# Patient Record
Sex: MALE | Race: WHITE | Employment: OTHER | URBAN - METROPOLITAN AREA
[De-identification: names, ages, dates, MRNs, and addresses within clinical notes are randomized per-mention and may not be internally consistent; named-entity substitution may affect disease eponyms.]

---

## 2017-02-01 ENCOUNTER — APPOINTMENT (OUTPATIENT)
Dept: LAB | Age: 72
End: 2017-02-01
Attending: INTERNAL MEDICINE
Payer: MEDICARE

## 2017-02-01 DIAGNOSIS — R80.9 PROTEINURIA: ICD-10-CM

## 2017-02-01 DIAGNOSIS — N18.3 CKD (CHRONIC KIDNEY DISEASE), STAGE 3 (MODERATE): ICD-10-CM

## 2017-02-01 LAB
BILIRUB UR QL STRIP.AUTO: NEGATIVE
BUN BLD-MCNC: 19 MG/DL (ref 8–20)
CALCIUM BLD-MCNC: 9 MG/DL (ref 8.3–10.3)
CHLORIDE: 107 MMOL/L (ref 101–111)
CLARITY UR REFRACT.AUTO: CLEAR
CO2: 25 MMOL/L (ref 22–32)
COLOR UR AUTO: YELLOW
CREAT BLD-MCNC: 1.39 MG/DL (ref 0.7–1.3)
CREAT UR-SCNC: 129 MG/DL
GLUCOSE BLD-MCNC: 95 MG/DL (ref 70–99)
GLUCOSE UR STRIP.AUTO-MCNC: NEGATIVE MG/DL
HYALINE CASTS #/AREA URNS AUTO: PRESENT /LPF
KETONES UR STRIP.AUTO-MCNC: NEGATIVE MG/DL
LEUKOCYTE ESTERASE UR QL STRIP.AUTO: NEGATIVE
NITRITE UR QL STRIP.AUTO: NEGATIVE
PH UR STRIP.AUTO: 5 [PH] (ref 4.5–8)
POTASSIUM SERPL-SCNC: 4.3 MMOL/L (ref 3.6–5.1)
PROT UR STRIP.AUTO-MCNC: 100 MG/DL
PROT UR-MCNC: 83.2 MG/DL
SODIUM SERPL-SCNC: 139 MMOL/L (ref 136–144)
SP GR UR STRIP.AUTO: 1.02 (ref 1–1.03)
UROBILINOGEN UR STRIP.AUTO-MCNC: <2 MG/DL

## 2017-02-01 PROCEDURE — 81001 URINALYSIS AUTO W/SCOPE: CPT

## 2017-02-01 PROCEDURE — 82570 ASSAY OF URINE CREATININE: CPT

## 2017-02-01 PROCEDURE — 84156 ASSAY OF PROTEIN URINE: CPT

## 2017-02-01 PROCEDURE — 80048 BASIC METABOLIC PNL TOTAL CA: CPT

## 2017-02-02 ENCOUNTER — TELEPHONE (OUTPATIENT)
Dept: NEPHROLOGY | Facility: CLINIC | Age: 72
End: 2017-02-02

## 2017-02-02 DIAGNOSIS — R80.9 PROTEINURIA, UNSPECIFIED: ICD-10-CM

## 2017-02-02 DIAGNOSIS — N18.3 CKD (CHRONIC KIDNEY DISEASE), STAGE 3 (MODERATE): Primary | ICD-10-CM

## 2017-05-09 ENCOUNTER — APPOINTMENT (OUTPATIENT)
Dept: LAB | Age: 72
End: 2017-05-09
Attending: INTERNAL MEDICINE
Payer: MEDICARE

## 2017-05-09 DIAGNOSIS — N18.30 STAGE 3 CHRONIC KIDNEY DISEASE (HCC): ICD-10-CM

## 2017-05-09 DIAGNOSIS — R80.9 PROTEINURIA, UNSPECIFIED: ICD-10-CM

## 2017-05-09 PROCEDURE — 84156 ASSAY OF PROTEIN URINE: CPT

## 2017-05-09 PROCEDURE — 81001 URINALYSIS AUTO W/SCOPE: CPT

## 2017-05-09 PROCEDURE — 80048 BASIC METABOLIC PNL TOTAL CA: CPT

## 2017-05-09 PROCEDURE — 36415 COLL VENOUS BLD VENIPUNCTURE: CPT

## 2017-05-09 PROCEDURE — 82570 ASSAY OF URINE CREATININE: CPT

## 2017-05-12 ENCOUNTER — TELEPHONE (OUTPATIENT)
Dept: NEPHROLOGY | Facility: CLINIC | Age: 72
End: 2017-05-12

## 2017-05-12 DIAGNOSIS — N18.30 CKD (CHRONIC KIDNEY DISEASE) STAGE 3, GFR 30-59 ML/MIN (HCC): Primary | ICD-10-CM

## 2017-05-12 DIAGNOSIS — R80.9 PROTEINURIA, UNSPECIFIED TYPE: ICD-10-CM

## 2017-06-07 PROBLEM — N28.1 RENAL CYST: Status: ACTIVE | Noted: 2017-06-07

## 2017-06-07 PROBLEM — N28.9: Status: ACTIVE | Noted: 2017-06-07

## 2017-06-12 ENCOUNTER — OFFICE VISIT (OUTPATIENT)
Dept: INTERNAL MEDICINE CLINIC | Facility: CLINIC | Age: 72
End: 2017-06-12

## 2017-06-12 VITALS
HEART RATE: 68 BPM | SYSTOLIC BLOOD PRESSURE: 116 MMHG | DIASTOLIC BLOOD PRESSURE: 74 MMHG | WEIGHT: 233 LBS | BODY MASS INDEX: 35.31 KG/M2 | HEIGHT: 68 IN | TEMPERATURE: 98 F

## 2017-06-12 DIAGNOSIS — E78.00 PURE HYPERCHOLESTEROLEMIA: ICD-10-CM

## 2017-06-12 DIAGNOSIS — N18.30 CKD (CHRONIC KIDNEY DISEASE) STAGE 3, GFR 30-59 ML/MIN (HCC): ICD-10-CM

## 2017-06-12 DIAGNOSIS — R31.29 MICROHEMATURIA: ICD-10-CM

## 2017-06-12 DIAGNOSIS — E66.01 SEVERE OBESITY (BMI 35.0-39.9) WITH COMORBIDITY (HCC): Chronic | ICD-10-CM

## 2017-06-12 DIAGNOSIS — N28.1 RENAL CYST: Primary | ICD-10-CM

## 2017-06-12 DIAGNOSIS — I10 ESSENTIAL HYPERTENSION: ICD-10-CM

## 2017-06-12 DIAGNOSIS — Z00.00 ENCOUNTER FOR ANNUAL HEALTH EXAMINATION: ICD-10-CM

## 2017-06-12 DIAGNOSIS — I77.819 ECTASIS AORTA (HCC): ICD-10-CM

## 2017-06-12 DIAGNOSIS — N28.9 RENAL PARENCHYMAL DISEASE: ICD-10-CM

## 2017-06-12 DIAGNOSIS — N52.9 IMPOTENCE: ICD-10-CM

## 2017-06-12 PROCEDURE — 96160 PT-FOCUSED HLTH RISK ASSMT: CPT | Performed by: NURSE PRACTITIONER

## 2017-06-12 RX ORDER — LOSARTAN POTASSIUM 50 MG/1
50 TABLET ORAL DAILY
Qty: 90 TABLET | Refills: 3 | Status: SHIPPED | OUTPATIENT
Start: 2017-06-12 | End: 2018-06-25

## 2017-06-12 RX ORDER — AMLODIPINE BESYLATE 5 MG/1
5 TABLET ORAL DAILY
Qty: 90 TABLET | Refills: 3 | Status: SHIPPED | OUTPATIENT
Start: 2017-06-12 | End: 2018-06-25

## 2017-06-12 RX ORDER — ATORVASTATIN CALCIUM 10 MG/1
10 TABLET, FILM COATED ORAL NIGHTLY
Qty: 90 TABLET | Refills: 3 | Status: SHIPPED | OUTPATIENT
Start: 2017-06-12 | End: 2018-06-25

## 2017-06-12 NOTE — PATIENT INSTRUCTIONS
Joseph Alvarez's SCREENING SCHEDULE   Tests on this list are recommended by your physician but may not be covered, or covered at this frequency, by your insurer. Please check with your insurance carrier before scheduling to verify coverage.     Mary Alice Anton lifetime   • Anyone with a family history    Colorectal Cancer Screening Covered up to Age 76     Colonoscopy Screen   Covered every 10 years- more often if abnormal Colonoscopy,10 Years due on 01/01/2019 Update Health Maintenance if applicable    Flex Sig metal- TD and TDaP Not covered by Medicare Part B) No orders found for this or any previous visit.  This may be covered with your prescription benefits, but Medicare does not cover unless Medically needed    Zoster (Not covered by Medicare Part B) No orders

## 2017-06-12 NOTE — PROGRESS NOTES
HPI:   Isai Montoya is a 81year old male who presents for a MA (Medicare Advantage) [facility] (Once per calendar year). Renal cyst  Stable. Continue to monitor. Dr Cayetano Hess    Renal parenchymal disease  Stable. Continue to monitor.    Dr Brook Cisse and  Results  Component Value Date   AST 24 11/16/2016   ALT 39 11/16/2016   CA 9.5 05/09/2017   ALB 3.6 11/16/2016   TSH 2.130 11/16/2016   CREATSERUM 1.35* 05/09/2017   GLU 92 05/09/2017        CBC  (most recent labs)     Lab Results  Component Value Date   W nocturia, no complaint of urinary incontinence  MUSCULOSKELETAL: denies back pain  NEURO: denies headaches  PSYCHE: denies depression or anxiety      EXAM:   /74 mmHg  Pulse 68  Temp(Src) 97.5 °F (36.4 °C) (Oral)  Ht 68\"  Wt 233 lb  BMI 35.44 kg/m2 scheduling to verify coverage. PREVENTATIVE SERVICES  INDICATIONS AND SCHEDULE Internal Lab or Procedure External Lab or Procedure   Diabetes Screening      HbgA1C     At Least  Annually for Diabetics No results found for: A1C No flowsheet data found. Health Maintenance if applicable    Flex Sigmoidoscopy Screen  Covered every 5 years No results found for this or any previous visit. No flowsheet data found.      Fecal Occult Blood   Covered Annually No results found for: FOB, OCCULTSTOOL No flowsheet samira (Not covered by Medicare Part B) No orders found for this or any previous visit. This may be covered with your pharmacy  prescription benefits     Recommended Websites for Advanced Directives    [email or web address].no. org/publications/Documents/personal_[email or web address]. pdf 10mg.     Essential hypertension  Stable. Amlodipine and losartan. Severe obesity (BMI 35.0-39. 9) with comorbidity ([address]  He has been exercising more. Down 10# since last year. Bike riding. Stable. Continue to monitor.  Diet, exercise, weight loss another office such as Influenza, Hepatitis B, Tetanus, or Pneumococcal?: No     Functional Ability     Bathing or Showering: Able without help    Toileting: Able without help    Dressing: Able without help    Eating: Able without help    Driving: Able wit Correct    Recall \"Tree\": Correct       This section provided for quick review of chart, separate sheet to patient  PREVENTATIVE SERVICES  INDICATIONS AND SCHEDULE Internal Lab or Procedure External Lab or Procedure   Diabetes Screening      HbgA1C   Gaye Grew diuretics, anticonvulsants.)    Potassium  Annually POTASSIUM (mmol/L)   Date Value   05/09/2017 4.7   04/05/2014 4.5   02/27/2013 4.2    No flowsheet data found.     Creatinine  Annually CREATININE (mg/dL)   Date Value   05/09/2017 1.35*   04/05/2014 1.31*

## 2017-06-14 ENCOUNTER — PATIENT MESSAGE (OUTPATIENT)
Dept: INTERNAL MEDICINE CLINIC | Facility: CLINIC | Age: 72
End: 2017-06-14

## 2017-06-14 NOTE — TELEPHONE ENCOUNTER
From: Fidelina Wilkinson  To: DAVID Carranza  Sent: 6/14/2017 12:21 PM CDT  Subject: Visit Follow-up Question    I received a garbled voicemail I could not understand yesterday, tuesday, from a nurse which i can only assume was in reference to my message

## 2017-08-01 ENCOUNTER — LAB ENCOUNTER (OUTPATIENT)
Dept: LAB | Age: 72
End: 2017-08-01
Attending: INTERNAL MEDICINE
Payer: MEDICARE

## 2017-08-01 DIAGNOSIS — R31.29 MICROHEMATURIA: ICD-10-CM

## 2017-08-01 DIAGNOSIS — R80.9 PROTEINURIA, UNSPECIFIED TYPE: ICD-10-CM

## 2017-08-01 DIAGNOSIS — E78.00 PURE HYPERCHOLESTEROLEMIA: ICD-10-CM

## 2017-08-01 DIAGNOSIS — N18.30 CKD (CHRONIC KIDNEY DISEASE) STAGE 3, GFR 30-59 ML/MIN (HCC): ICD-10-CM

## 2017-08-01 DIAGNOSIS — N52.9 IMPOTENCE: ICD-10-CM

## 2017-08-01 LAB
ALBUMIN SERPL-MCNC: 3.7 G/DL (ref 3.5–4.8)
ALP LIVER SERPL-CCNC: 78 U/L (ref 45–117)
ALT SERPL-CCNC: 32 U/L (ref 17–63)
AST SERPL-CCNC: 22 U/L (ref 15–41)
BASOPHILS # BLD AUTO: 0.03 X10(3) UL (ref 0–0.1)
BASOPHILS NFR BLD AUTO: 0.5 %
BILIRUB SERPL-MCNC: 0.5 MG/DL (ref 0.1–2)
BILIRUB UR QL STRIP.AUTO: NEGATIVE
BUN BLD-MCNC: 21 MG/DL (ref 8–20)
CALCIUM BLD-MCNC: 9.1 MG/DL (ref 8.3–10.3)
CHLORIDE: 109 MMOL/L (ref 101–111)
CHOLEST SMN-MCNC: 137 MG/DL (ref ?–200)
CLARITY UR REFRACT.AUTO: CLEAR
CO2: 22 MMOL/L (ref 22–32)
COLOR UR AUTO: YELLOW
CREAT BLD-MCNC: 1.34 MG/DL (ref 0.7–1.3)
CREAT UR-SCNC: 175 MG/DL
EOSINOPHIL # BLD AUTO: 0.17 X10(3) UL (ref 0–0.3)
EOSINOPHIL NFR BLD AUTO: 3.1 %
ERYTHROCYTE [DISTWIDTH] IN BLOOD BY AUTOMATED COUNT: 14.2 % (ref 11.5–16)
GLUCOSE BLD-MCNC: 85 MG/DL (ref 70–99)
GLUCOSE UR STRIP.AUTO-MCNC: NEGATIVE MG/DL
HCT VFR BLD AUTO: 41.9 % (ref 37–53)
HDLC SERPL-MCNC: 36 MG/DL (ref 45–?)
HDLC SERPL: 3.81 {RATIO} (ref ?–4.97)
HGB BLD-MCNC: 13.1 G/DL (ref 13–17)
IMMATURE GRANULOCYTE COUNT: 0.02 X10(3) UL (ref 0–1)
IMMATURE GRANULOCYTE RATIO %: 0.4 %
KETONES UR STRIP.AUTO-MCNC: NEGATIVE MG/DL
LDLC SERPL CALC-MCNC: 81 MG/DL (ref ?–130)
LDLC SERPL-MCNC: 20 MG/DL (ref 5–40)
LEUKOCYTE ESTERASE UR QL STRIP.AUTO: NEGATIVE
LYMPHOCYTES # BLD AUTO: 1.28 X10(3) UL (ref 0.9–4)
LYMPHOCYTES NFR BLD AUTO: 23.3 %
M PROTEIN MFR SERPL ELPH: 6.9 G/DL (ref 6.1–8.3)
MCH RBC QN AUTO: 26.6 PG (ref 27–33.2)
MCHC RBC AUTO-ENTMCNC: 31.3 G/DL (ref 31–37)
MCV RBC AUTO: 85 FL (ref 80–99)
MONOCYTES # BLD AUTO: 0.52 X10(3) UL (ref 0.1–0.6)
MONOCYTES NFR BLD AUTO: 9.5 %
NEUTROPHIL ABS PRELIM: 3.47 X10 (3) UL (ref 1.3–6.7)
NEUTROPHILS # BLD AUTO: 3.47 X10(3) UL (ref 1.3–6.7)
NEUTROPHILS NFR BLD AUTO: 63.2 %
NITRITE UR QL STRIP.AUTO: NEGATIVE
NONHDLC SERPL-MCNC: 101 MG/DL (ref ?–130)
PH UR STRIP.AUTO: 5 [PH] (ref 4.5–8)
PLATELET # BLD AUTO: 218 10(3)UL (ref 150–450)
POTASSIUM SERPL-SCNC: 4.4 MMOL/L (ref 3.6–5.1)
PROT UR STRIP.AUTO-MCNC: 100 MG/DL
PROT UR-MCNC: 86.5 MG/DL
PSA SERPL-MCNC: 1.78 NG/ML (ref 0.01–4)
RBC # BLD AUTO: 4.93 X10(6)UL (ref 3.8–5.8)
RED CELL DISTRIBUTION WIDTH-SD: 43 FL (ref 35.1–46.3)
SODIUM SERPL-SCNC: 140 MMOL/L (ref 136–144)
SP GR UR STRIP.AUTO: 1.02 (ref 1–1.03)
TRIGLYCERIDES: 102 MG/DL (ref ?–150)
UROBILINOGEN UR STRIP.AUTO-MCNC: <2 MG/DL
WBC # BLD AUTO: 5.5 X10(3) UL (ref 4–13)

## 2017-08-01 PROCEDURE — 81001 URINALYSIS AUTO W/SCOPE: CPT

## 2017-08-01 PROCEDURE — 85025 COMPLETE CBC W/AUTO DIFF WBC: CPT

## 2017-08-01 PROCEDURE — 80061 LIPID PANEL: CPT

## 2017-08-01 PROCEDURE — 84156 ASSAY OF PROTEIN URINE: CPT

## 2017-08-01 PROCEDURE — 82570 ASSAY OF URINE CREATININE: CPT

## 2017-08-01 PROCEDURE — 84153 ASSAY OF PSA TOTAL: CPT

## 2017-08-01 PROCEDURE — 36415 COLL VENOUS BLD VENIPUNCTURE: CPT

## 2017-08-01 PROCEDURE — 80053 COMPREHEN METABOLIC PANEL: CPT

## 2017-08-07 DIAGNOSIS — N18.30 CKD (CHRONIC KIDNEY DISEASE) STAGE 3, GFR 30-59 ML/MIN (HCC): Primary | ICD-10-CM

## 2017-10-05 ENCOUNTER — OFFICE VISIT (OUTPATIENT)
Dept: FAMILY MEDICINE CLINIC | Facility: CLINIC | Age: 72
End: 2017-10-05

## 2017-10-05 VITALS
WEIGHT: 225 LBS | SYSTOLIC BLOOD PRESSURE: 104 MMHG | BODY MASS INDEX: 34.1 KG/M2 | DIASTOLIC BLOOD PRESSURE: 66 MMHG | HEIGHT: 68 IN

## 2017-10-05 DIAGNOSIS — H61.21 IMPACTED CERUMEN OF RIGHT EAR: Primary | ICD-10-CM

## 2017-10-05 PROCEDURE — 99202 OFFICE O/P NEW SF 15 MIN: CPT | Performed by: NURSE PRACTITIONER

## 2017-10-05 NOTE — PROGRESS NOTES
CHIEF COMPLAINT:   Patient presents with:  Ear Pain      HPI:   Clemencia Valentine is a 81year old male who presents to clinic today with complaints of clogged ears. Patient requesting ear flush. Patient reports ongoing issue with cerumen build up.  Lj EYES: conjunctiva clear, EOM intact    EARS:  Bilateral hearing is  to conversation, whisper, and finger rub. Bilateral tragus is non tender with manipulation.     Pre procedure external right auditory canals right canal occluded with cerumen, TM not vi Patient Instructions       Impacted Earwax  Impacted earwax is a buildup of the natural wax in the ear (cerumen). Impacted earwax is very common. It can cause symptoms such as hearing loss. It can also stop a doctor doing an exam of your ear.   Estanislado Goodpasture · Dizziness  · Ringing in the ears  · Cough  Treatment for impacted earwax  If you don’t have symptoms, you may not need treatment. Often the earwax goes away on its own with time.  If you have symptoms, you may have 1 or more treatments such as:  · Ear sanaz

## 2017-11-03 ENCOUNTER — PATIENT MESSAGE (OUTPATIENT)
Dept: INTERNAL MEDICINE CLINIC | Facility: CLINIC | Age: 72
End: 2017-11-03

## 2017-11-03 ENCOUNTER — APPOINTMENT (OUTPATIENT)
Dept: LAB | Age: 72
End: 2017-11-03
Attending: INTERNAL MEDICINE
Payer: MEDICARE

## 2017-11-03 DIAGNOSIS — N18.30 CHRONIC KIDNEY DISEASE, STAGE III (MODERATE) (HCC): ICD-10-CM

## 2017-11-03 DIAGNOSIS — N18.30 CKD (CHRONIC KIDNEY DISEASE) STAGE 3, GFR 30-59 ML/MIN (HCC): ICD-10-CM

## 2017-11-03 PROCEDURE — 36415 COLL VENOUS BLD VENIPUNCTURE: CPT

## 2017-11-03 PROCEDURE — 80053 COMPREHEN METABOLIC PANEL: CPT

## 2017-11-03 PROCEDURE — 84156 ASSAY OF PROTEIN URINE: CPT

## 2017-11-03 PROCEDURE — 82570 ASSAY OF URINE CREATININE: CPT

## 2017-11-03 PROCEDURE — 81001 URINALYSIS AUTO W/SCOPE: CPT

## 2017-11-14 ENCOUNTER — TELEPHONE (OUTPATIENT)
Dept: NEPHROLOGY | Facility: CLINIC | Age: 72
End: 2017-11-14

## 2017-11-14 DIAGNOSIS — N18.30 CKD (CHRONIC KIDNEY DISEASE) STAGE 3, GFR 30-59 ML/MIN (HCC): Primary | ICD-10-CM

## 2017-11-14 DIAGNOSIS — R80.9 PROTEINURIA, UNSPECIFIED TYPE: ICD-10-CM

## 2017-11-15 NOTE — TELEPHONE ENCOUNTER
Left pt VM- renal function stable; proteinuria resolved after stopping NSAIDS; to repeat labs and f/u with me in 3 months- migue castro

## 2018-02-27 ENCOUNTER — APPOINTMENT (OUTPATIENT)
Dept: LAB | Age: 73
End: 2018-02-27
Attending: INTERNAL MEDICINE
Payer: MEDICARE

## 2018-02-27 DIAGNOSIS — R80.9 PROTEINURIA, UNSPECIFIED TYPE: ICD-10-CM

## 2018-02-27 DIAGNOSIS — N18.30 CKD (CHRONIC KIDNEY DISEASE) STAGE 3, GFR 30-59 ML/MIN (HCC): ICD-10-CM

## 2018-02-27 LAB
BILIRUB UR QL STRIP.AUTO: NEGATIVE
BUN BLD-MCNC: 18 MG/DL (ref 8–20)
CALCIUM BLD-MCNC: 9.5 MG/DL (ref 8.3–10.3)
CHLORIDE: 107 MMOL/L (ref 101–111)
CLARITY UR REFRACT.AUTO: CLEAR
CO2: 28 MMOL/L (ref 22–32)
COLOR UR AUTO: YELLOW
CREAT BLD-MCNC: 1.26 MG/DL (ref 0.7–1.3)
CREAT UR-SCNC: 141 MG/DL
GLUCOSE BLD-MCNC: 97 MG/DL (ref 70–99)
GLUCOSE UR STRIP.AUTO-MCNC: NEGATIVE MG/DL
KETONES UR STRIP.AUTO-MCNC: NEGATIVE MG/DL
LEUKOCYTE ESTERASE UR QL STRIP.AUTO: NEGATIVE
NITRITE UR QL STRIP.AUTO: NEGATIVE
PH UR STRIP.AUTO: 5 [PH] (ref 4.5–8)
POTASSIUM SERPL-SCNC: 4.6 MMOL/L (ref 3.6–5.1)
PROT UR STRIP.AUTO-MCNC: 30 MG/DL
PROT UR-MCNC: 34.7 MG/DL
RBC UR QL AUTO: NEGATIVE
SODIUM SERPL-SCNC: 141 MMOL/L (ref 136–144)
SP GR UR STRIP.AUTO: 1.02 (ref 1–1.03)
UROBILINOGEN UR STRIP.AUTO-MCNC: <2 MG/DL

## 2018-02-27 PROCEDURE — 82570 ASSAY OF URINE CREATININE: CPT

## 2018-02-27 PROCEDURE — 80048 BASIC METABOLIC PNL TOTAL CA: CPT

## 2018-02-27 PROCEDURE — 81001 URINALYSIS AUTO W/SCOPE: CPT

## 2018-02-27 PROCEDURE — 36415 COLL VENOUS BLD VENIPUNCTURE: CPT

## 2018-02-27 PROCEDURE — 84156 ASSAY OF PROTEIN URINE: CPT

## 2018-02-28 ENCOUNTER — TELEPHONE (OUTPATIENT)
Dept: NEPHROLOGY | Facility: CLINIC | Age: 73
End: 2018-02-28

## 2018-02-28 NOTE — TELEPHONE ENCOUNTER
D/w pt- no significant proteinuria (urine p:c ratio 0.25) and UA otherwise bland; renal function has nearly normalized- to avoid all NSAIDS; ongoing f/u with dr. Elier Sparrow- migue Hughes

## 2018-03-01 ENCOUNTER — MED REC SCAN ONLY (OUTPATIENT)
Dept: INTERNAL MEDICINE CLINIC | Facility: CLINIC | Age: 73
End: 2018-03-01

## 2018-03-29 ENCOUNTER — TELEPHONE (OUTPATIENT)
Dept: INTERNAL MEDICINE CLINIC | Facility: CLINIC | Age: 73
End: 2018-03-29

## 2018-03-29 DIAGNOSIS — N18.30 CKD (CHRONIC KIDNEY DISEASE) STAGE 3, GFR 30-59 ML/MIN (HCC): Primary | ICD-10-CM

## 2018-03-29 DIAGNOSIS — E78.00 HYPERCHOLESTEROLEMIA: ICD-10-CM

## 2018-03-29 DIAGNOSIS — I10 ESSENTIAL HYPERTENSION: ICD-10-CM

## 2018-03-29 NOTE — TELEPHONE ENCOUNTER
Pt has Medicare Advantage appt with SD on 6-25-18 and would like BW orders sent to [facility].  Pt aware to fast.

## 2018-06-25 ENCOUNTER — OFFICE VISIT (OUTPATIENT)
Dept: INTERNAL MEDICINE CLINIC | Facility: CLINIC | Age: 73
End: 2018-06-25

## 2018-06-25 VITALS
TEMPERATURE: 98 F | RESPIRATION RATE: 16 BRPM | WEIGHT: 226 LBS | DIASTOLIC BLOOD PRESSURE: 60 MMHG | HEART RATE: 66 BPM | SYSTOLIC BLOOD PRESSURE: 116 MMHG | BODY MASS INDEX: 34.25 KG/M2 | HEIGHT: 68 IN

## 2018-06-25 DIAGNOSIS — M17.0 BILATERAL PRIMARY OSTEOARTHRITIS OF KNEE: Primary | ICD-10-CM

## 2018-06-25 DIAGNOSIS — N52.9 IMPOTENCE: ICD-10-CM

## 2018-06-25 DIAGNOSIS — I77.819 ECTASIS AORTA (HCC): ICD-10-CM

## 2018-06-25 DIAGNOSIS — E66.09 CLASS 1 OBESITY DUE TO EXCESS CALORIES WITHOUT SERIOUS COMORBIDITY WITH BODY MASS INDEX (BMI) OF 34.0 TO 34.9 IN ADULT: ICD-10-CM

## 2018-06-25 DIAGNOSIS — E78.00 PURE HYPERCHOLESTEROLEMIA: ICD-10-CM

## 2018-06-25 DIAGNOSIS — N28.1 RENAL CYST: ICD-10-CM

## 2018-06-25 DIAGNOSIS — Z00.00 ENCOUNTER FOR ANNUAL HEALTH EXAMINATION: ICD-10-CM

## 2018-06-25 DIAGNOSIS — N28.9 RENAL PARENCHYMAL DISEASE: ICD-10-CM

## 2018-06-25 DIAGNOSIS — Z11.59 NEED FOR HEPATITIS C SCREENING TEST: ICD-10-CM

## 2018-06-25 DIAGNOSIS — N18.30 CKD (CHRONIC KIDNEY DISEASE) STAGE 3, GFR 30-59 ML/MIN (HCC): ICD-10-CM

## 2018-06-25 DIAGNOSIS — I10 ESSENTIAL HYPERTENSION: ICD-10-CM

## 2018-06-25 DIAGNOSIS — R31.29 MICROHEMATURIA: ICD-10-CM

## 2018-06-25 PROBLEM — E66.811 CLASS 1 OBESITY DUE TO EXCESS CALORIES WITHOUT SERIOUS COMORBIDITY WITH BODY MASS INDEX (BMI) OF 34.0 TO 34.9 IN ADULT: Status: ACTIVE | Noted: 2017-06-12

## 2018-06-25 PROCEDURE — G0439 PPPS, SUBSEQ VISIT: HCPCS | Performed by: NURSE PRACTITIONER

## 2018-06-25 PROCEDURE — 96160 PT-FOCUSED HLTH RISK ASSMT: CPT | Performed by: NURSE PRACTITIONER

## 2018-06-25 RX ORDER — AMLODIPINE BESYLATE 5 MG/1
5 TABLET ORAL DAILY
Qty: 90 TABLET | Refills: 3 | Status: SHIPPED | OUTPATIENT
Start: 2018-06-25 | End: 2019-06-07

## 2018-06-25 RX ORDER — ATORVASTATIN CALCIUM 10 MG/1
10 TABLET, FILM COATED ORAL NIGHTLY
Qty: 90 TABLET | Refills: 3 | Status: SHIPPED | OUTPATIENT
Start: 2018-06-25 | End: 2019-06-07

## 2018-06-25 RX ORDER — LOSARTAN POTASSIUM 50 MG/1
50 TABLET ORAL DAILY
Qty: 90 TABLET | Refills: 3 | Status: SHIPPED | OUTPATIENT
Start: 2018-06-25 | End: 2019-06-07

## 2018-06-25 NOTE — PATIENT INSTRUCTIONS
Bryanna Alvarez's SCREENING SCHEDULE   Tests on this list are recommended by your physician but may not be covered, or covered at this frequency, by your insurer. Please check with your insurance carrier before scheduling to verify coverage.     Patrick Kawasaki • Men who are 73-68 years old and have smoked more than 100 cigarettes in their lifetime   • Anyone with a family history    Colorectal Cancer Screening Covered up to Age 76     Colonoscopy Screen   Covered every 10 years- more often if abnormal Colonosc Illicit injectable drug abusers     Tetanus Toxoid- Only covered with a cut with metal- TD and TDaP Not covered by Medicare Part B) No orders found for this or any previous visit.  This may be covered with your prescription benefits, but Medicare does not

## 2018-06-25 NOTE — PROGRESS NOTES
HPI:   Stefani Avitia is a 72year old male who presents for a MA (Medicare Advantage) [facility] [facility] (Once per calendar year). Bilateral primary osteoarthritis of knee  Has seen ortho recently   Added glucosamine   Seems to be helping at this point.    Co (PHQ-2/PHQ-9): Over the LAST 2 WEEKS   Little interest or pleasure in doing things (over the last two weeks)?: Not at all  Feeling down, depressed, or hopeless (over the last two weeks)?: Not at all  PHQ-2 SCORE: 0     Advanced Directive:   He does NOT hav Outpatient Prescriptions Marked as Taking for the 6/25/18 encounter (Office Visit) with DAVID Zimmerman:  atorvastatin 10 MG Oral Tab Take 1 tablet (10 mg total) by mouth nightly.    Losartan Potassium 50 MG Oral Tab Take 1 tablet (50 mg total) by mo EXAM:   /60   Pulse 66   Temp 98 °F (36.7 °C) (Oral)   Resp 16   Ht 68\"   Wt 226 lb   BMI 34.36 kg/m²   Estimated body mass index is 34.36 kg/m² as calculated from the following:    Height as of this encounter: 68\".     Weight as of this encou ASSESSMENT AND OTHER RELEVANT CHRONIC CONDITIONS:   Miguelina Olivares is a 72year old male who presents for a Medicare Assessment.      PLAN SUMMARY:   Diagnoses and all orders for this visit:    Bilateral primary osteoarthritis of knee  Has seen ortho Procedure   Diabetes Screening      HbgA1C   Annually No results found for: A1C    No flowsheet data found.     Fasting Blood Sugar (FSB)Annually   Glucose (mg/dL)   Date Value   02/27/2018 97   ----------  GLUCOSE (mg/dL)   Date Value   04/05/2014 99   --- may be covered with your prescription benefits, but Medicare does not cover unless Medically needed    Zoster   Discussed shigrix.    Not covered by Medicare Part B No vaccine history found This may be covered with your pharmacy  prescription benefits

## 2018-06-26 ENCOUNTER — LAB ENCOUNTER (OUTPATIENT)
Dept: LAB | Age: 73
End: 2018-06-26
Attending: UROLOGY
Payer: MEDICARE

## 2018-06-26 DIAGNOSIS — Z11.59 NEED FOR HEPATITIS C SCREENING TEST: ICD-10-CM

## 2018-06-26 DIAGNOSIS — N18.30 CKD (CHRONIC KIDNEY DISEASE) STAGE 3, GFR 30-59 ML/MIN (HCC): ICD-10-CM

## 2018-06-26 DIAGNOSIS — R31.29 MICROHEMATURIA: ICD-10-CM

## 2018-06-26 DIAGNOSIS — E78.00 HYPERCHOLESTEROLEMIA: ICD-10-CM

## 2018-06-26 DIAGNOSIS — I10 ESSENTIAL HYPERTENSION: ICD-10-CM

## 2018-06-26 PROBLEM — E66.9 OBESITY, UNSPECIFIED: Status: ACTIVE | Noted: 2017-06-12

## 2018-06-26 PROCEDURE — 36415 COLL VENOUS BLD VENIPUNCTURE: CPT

## 2018-06-26 PROCEDURE — 85025 COMPLETE CBC W/AUTO DIFF WBC: CPT

## 2018-06-26 PROCEDURE — 84153 ASSAY OF PSA TOTAL: CPT

## 2018-06-26 PROCEDURE — 86803 HEPATITIS C AB TEST: CPT

## 2018-06-26 PROCEDURE — 84443 ASSAY THYROID STIM HORMONE: CPT

## 2018-06-26 PROCEDURE — 80061 LIPID PANEL: CPT

## 2018-06-26 PROCEDURE — 80053 COMPREHEN METABOLIC PANEL: CPT

## 2018-06-27 DIAGNOSIS — N28.9: Primary | ICD-10-CM

## 2018-07-27 ENCOUNTER — APPOINTMENT (OUTPATIENT)
Dept: LAB | Age: 73
End: 2018-07-27
Attending: NURSE PRACTITIONER
Payer: MEDICARE

## 2018-07-27 DIAGNOSIS — N28.9: ICD-10-CM

## 2018-07-27 LAB
ANION GAP SERPL CALC-SCNC: 9 MMOL/L (ref 0–18)
BUN BLD-MCNC: 22 MG/DL (ref 8–20)
BUN/CREAT SERPL: 15.4 (ref 10–20)
CALCIUM BLD-MCNC: 9 MG/DL (ref 8.3–10.3)
CHLORIDE SERPL-SCNC: 110 MMOL/L (ref 101–111)
CO2 SERPL-SCNC: 22 MMOL/L (ref 22–32)
CREAT BLD-MCNC: 1.43 MG/DL (ref 0.7–1.3)
GLUCOSE BLD-MCNC: 94 MG/DL (ref 70–99)
OSMOLALITY SERPL CALC.SUM OF ELEC: 295 MOSM/KG (ref 275–295)
POTASSIUM SERPL-SCNC: 4.5 MMOL/L (ref 3.6–5.1)
SODIUM SERPL-SCNC: 141 MMOL/L (ref 136–144)

## 2018-07-27 PROCEDURE — 36415 COLL VENOUS BLD VENIPUNCTURE: CPT

## 2018-07-27 PROCEDURE — 80048 BASIC METABOLIC PNL TOTAL CA: CPT

## 2018-08-03 DIAGNOSIS — N18.30 CKD (CHRONIC KIDNEY DISEASE) STAGE 3, GFR 30-59 ML/MIN (HCC): ICD-10-CM

## 2018-08-03 DIAGNOSIS — E78.00 PURE HYPERCHOLESTEROLEMIA: Primary | ICD-10-CM

## 2018-10-26 ENCOUNTER — OFFICE VISIT (OUTPATIENT)
Dept: FAMILY MEDICINE CLINIC | Facility: CLINIC | Age: 73
End: 2018-10-26
Payer: MEDICARE

## 2018-10-26 VITALS
WEIGHT: 225.81 LBS | SYSTOLIC BLOOD PRESSURE: 110 MMHG | OXYGEN SATURATION: 98 % | HEART RATE: 80 BPM | RESPIRATION RATE: 16 BRPM | DIASTOLIC BLOOD PRESSURE: 62 MMHG | BODY MASS INDEX: 34 KG/M2 | TEMPERATURE: 98 F

## 2018-10-26 DIAGNOSIS — R05.9 COUGH: ICD-10-CM

## 2018-10-26 DIAGNOSIS — J02.9 SORE THROAT: Primary | ICD-10-CM

## 2018-10-26 PROCEDURE — 99203 OFFICE O/P NEW LOW 30 MIN: CPT | Performed by: FAMILY MEDICINE

## 2018-10-26 PROCEDURE — 87880 STREP A ASSAY W/OPTIC: CPT | Performed by: FAMILY MEDICINE

## 2018-10-26 RX ORDER — AZITHROMYCIN 250 MG/1
TABLET, FILM COATED ORAL
Qty: 6 TABLET | Refills: 0 | Status: SHIPPED | OUTPATIENT
Start: 2018-10-26 | End: 2019-01-16 | Stop reason: ALTCHOICE

## 2018-10-26 NOTE — PROGRESS NOTES
HPI:    Patient ID: Atilio Lomax is a 72year old male. Patient presents with:  Sore Throat  Cough      HPI  Patient is here for cough and sore throat for 6 days. States cough is productive of yellowish sputum.  Had chills but did not check his tempe hemorrhage    • Microhematuria    • Other and unspecified hyperlipidemia    • Unspecified essential hypertension       Past Surgical History:   Procedure Laterality Date   • COLONOSCOPY  2002 2009   • TONSILLECTOMY  1957   • UPPER GI ENDOSCOPY,EXAM  2009 Oral Tab; Take two tablets by mouth today, then one daily. Juvenal Ann MD      The above note was dictated. Any errors in text might be due to dictation.

## 2019-01-03 ENCOUNTER — APPOINTMENT (OUTPATIENT)
Dept: LAB | Age: 74
End: 2019-01-03
Attending: NURSE PRACTITIONER
Payer: MEDICARE

## 2019-01-03 DIAGNOSIS — E78.00 PURE HYPERCHOLESTEROLEMIA: ICD-10-CM

## 2019-01-03 DIAGNOSIS — N18.30 CKD (CHRONIC KIDNEY DISEASE) STAGE 3, GFR 30-59 ML/MIN (HCC): ICD-10-CM

## 2019-01-03 LAB
ALBUMIN SERPL-MCNC: 3.7 G/DL (ref 3.1–4.5)
ALBUMIN/GLOB SERPL: 1.2 {RATIO} (ref 1–2)
ALP LIVER SERPL-CCNC: 124 U/L (ref 45–117)
ALT SERPL-CCNC: 29 U/L (ref 17–63)
ANION GAP SERPL CALC-SCNC: 4 MMOL/L (ref 0–18)
AST SERPL-CCNC: 18 U/L (ref 15–41)
BILIRUB SERPL-MCNC: 0.7 MG/DL (ref 0.1–2)
BUN BLD-MCNC: 23 MG/DL (ref 8–20)
BUN/CREAT SERPL: 14.9 (ref 10–20)
CALCIUM BLD-MCNC: 9.2 MG/DL (ref 8.3–10.3)
CHLORIDE SERPL-SCNC: 109 MMOL/L (ref 101–111)
CHOLEST SMN-MCNC: 162 MG/DL (ref ?–200)
CO2 SERPL-SCNC: 26 MMOL/L (ref 22–32)
CREAT BLD-MCNC: 1.54 MG/DL (ref 0.7–1.3)
GLOBULIN PLAS-MCNC: 3.2 G/DL (ref 2.8–4.4)
GLUCOSE BLD-MCNC: 91 MG/DL (ref 70–99)
HDLC SERPL-MCNC: 35 MG/DL (ref 40–59)
LDLC SERPL CALC-MCNC: 98 MG/DL (ref ?–100)
M PROTEIN MFR SERPL ELPH: 6.9 G/DL (ref 6.4–8.2)
NONHDLC SERPL-MCNC: 127 MG/DL (ref ?–130)
OSMOLALITY SERPL CALC.SUM OF ELEC: 291 MOSM/KG (ref 275–295)
POTASSIUM SERPL-SCNC: 4.3 MMOL/L (ref 3.6–5.1)
SODIUM SERPL-SCNC: 139 MMOL/L (ref 136–144)
TRIGL SERPL-MCNC: 144 MG/DL (ref 30–149)
VLDLC SERPL CALC-MCNC: 29 MG/DL (ref 0–30)

## 2019-01-03 PROCEDURE — 80053 COMPREHEN METABOLIC PANEL: CPT

## 2019-01-03 PROCEDURE — 36415 COLL VENOUS BLD VENIPUNCTURE: CPT

## 2019-01-03 PROCEDURE — 80061 LIPID PANEL: CPT

## 2019-01-10 ENCOUNTER — OFFICE VISIT (OUTPATIENT)
Dept: FAMILY MEDICINE CLINIC | Facility: CLINIC | Age: 74
End: 2019-01-10
Payer: MEDICARE

## 2019-01-10 VITALS
HEART RATE: 67 BPM | TEMPERATURE: 98 F | BODY MASS INDEX: 34.86 KG/M2 | OXYGEN SATURATION: 100 % | HEIGHT: 68 IN | SYSTOLIC BLOOD PRESSURE: 109 MMHG | DIASTOLIC BLOOD PRESSURE: 63 MMHG | WEIGHT: 230 LBS

## 2019-01-10 DIAGNOSIS — R09.82 PND (POST-NASAL DRIP): Primary | ICD-10-CM

## 2019-01-10 DIAGNOSIS — R05.8 COUGH PRESENT FOR GREATER THAN 3 WEEKS: ICD-10-CM

## 2019-01-10 PROCEDURE — 99213 OFFICE O/P EST LOW 20 MIN: CPT | Performed by: NURSE PRACTITIONER

## 2019-01-10 RX ORDER — AMOXICILLIN AND CLAVULANATE POTASSIUM 875; 125 MG/1; MG/1
1 TABLET, FILM COATED ORAL 2 TIMES DAILY
Qty: 20 TABLET | Refills: 0 | Status: SHIPPED | OUTPATIENT
Start: 2019-01-10 | End: 2019-01-20

## 2019-01-10 RX ORDER — METHYLPREDNISOLONE 4 MG/1
TABLET ORAL
Qty: 1 KIT | Refills: 0 | Status: SHIPPED | OUTPATIENT
Start: 2019-01-10 | End: 2019-01-16 | Stop reason: ALTCHOICE

## 2019-01-10 NOTE — PROGRESS NOTES
CHIEF COMPLAINT:   Patient presents with:  Cough: congestion x 2 months. no fever/v/d        HPI:   Ana Marx is a 72year old male who presents for cough for  2+  months.   Was seen at end of Oct here give z-rebeca, had some improvement, but still with Use      Smoking status: Never Smoker      Smokeless tobacco: Never Used    Alcohol use:  Yes      Alcohol/week: 0.6 oz      Types: 1 Standard drinks or equivalent per week      Comment: Cage done 6/25/2018, rare     Drug use: No       REVIEW OF SYSTEMS: care. Advised to follow up with PCP in 1-2 weeksif no improvement, sooner if worsening.      Meds & Refills for this Visit:  Requested Prescriptions     Signed Prescriptions Disp Refills   • methylPREDNISolone (MEDROL) 4 MG Oral Tablet Therapy Pack 1 kit 0

## 2019-01-16 ENCOUNTER — OFFICE VISIT (OUTPATIENT)
Dept: INTERNAL MEDICINE CLINIC | Facility: CLINIC | Age: 74
End: 2019-01-16
Payer: MEDICARE

## 2019-01-16 VITALS
HEART RATE: 60 BPM | WEIGHT: 228 LBS | SYSTOLIC BLOOD PRESSURE: 122 MMHG | DIASTOLIC BLOOD PRESSURE: 72 MMHG | BODY MASS INDEX: 34.56 KG/M2 | RESPIRATION RATE: 16 BRPM | HEIGHT: 68 IN | TEMPERATURE: 98 F

## 2019-01-16 DIAGNOSIS — N18.30 CKD (CHRONIC KIDNEY DISEASE) STAGE 3, GFR 30-59 ML/MIN (HCC): ICD-10-CM

## 2019-01-16 DIAGNOSIS — R74.8 ELEVATED ALKALINE PHOSPHATASE LEVEL: ICD-10-CM

## 2019-01-16 DIAGNOSIS — I10 ESSENTIAL HYPERTENSION: ICD-10-CM

## 2019-01-16 DIAGNOSIS — E78.00 PURE HYPERCHOLESTEROLEMIA: Primary | ICD-10-CM

## 2019-01-16 DIAGNOSIS — K20.90 ESOPHAGITIS: ICD-10-CM

## 2019-01-16 PROCEDURE — 99214 OFFICE O/P EST MOD 30 MIN: CPT | Performed by: NURSE PRACTITIONER

## 2019-01-16 NOTE — PROGRESS NOTES
Riley Corona is a 72year old male. Patient presents with: Follow - Up: to review labs. LB-rm 11      HPI:     HTN  Stable   On losartan and amlodipine.       CKD 3   Creat 1.54   Above baseline slightly  No significant use of NSAIDS  On ARB  Has seen Tobacco Use      Smoking status: Never Smoker      Smokeless tobacco: Never Used    Alcohol use:  Yes      Alcohol/week: 0.6 oz      Types: 1 Standard drinks or equivalent per week      Comment: cage 1/16/19    Drug use: No    Family History   Problem Relat are no Patient Instructions on file for this visit.

## 2019-05-03 ENCOUNTER — APPOINTMENT (OUTPATIENT)
Dept: LAB | Age: 74
End: 2019-05-03
Attending: NURSE PRACTITIONER
Payer: MEDICARE

## 2019-05-03 DIAGNOSIS — R74.8 ELEVATED ALKALINE PHOSPHATASE LEVEL: ICD-10-CM

## 2019-05-03 DIAGNOSIS — N18.30 CKD (CHRONIC KIDNEY DISEASE) STAGE 3, GFR 30-59 ML/MIN (HCC): ICD-10-CM

## 2019-05-03 PROCEDURE — 36415 COLL VENOUS BLD VENIPUNCTURE: CPT

## 2019-05-03 PROCEDURE — 80053 COMPREHEN METABOLIC PANEL: CPT

## 2019-05-03 PROCEDURE — 84075 ASSAY ALKALINE PHOSPHATASE: CPT

## 2019-05-03 PROCEDURE — 84080 ASSAY ALKALINE PHOSPHATASES: CPT

## 2019-05-09 ENCOUNTER — PATIENT OUTREACH (OUTPATIENT)
Dept: FAMILY MEDICINE CLINIC | Facility: CLINIC | Age: 74
End: 2019-05-09

## 2019-06-07 RX ORDER — LOSARTAN POTASSIUM 50 MG/1
50 TABLET ORAL DAILY
Qty: 90 TABLET | Refills: 0 | Status: SHIPPED | OUTPATIENT
Start: 2019-06-07 | End: 2019-08-14

## 2019-06-07 RX ORDER — AMLODIPINE BESYLATE 5 MG/1
5 TABLET ORAL DAILY
Qty: 90 TABLET | Refills: 0 | Status: SHIPPED | OUTPATIENT
Start: 2019-06-07 | End: 2019-08-14

## 2019-06-07 RX ORDER — ATORVASTATIN CALCIUM 10 MG/1
10 TABLET, FILM COATED ORAL NIGHTLY
Qty: 90 TABLET | Refills: 0 | Status: SHIPPED | OUTPATIENT
Start: 2019-06-07 | End: 2019-08-14

## 2019-06-07 NOTE — TELEPHONE ENCOUNTER
Last Ov: 1/16/19, SD, F/U  Upcoming appt: no upcoming scheduled appt  Last labs: Alk Phos, CMP 5/3/19  Last Rx:   Amlodipine 5mg, #90, 3 R, 6/25/18  Atorvastatin 10mg, #90, 3 R, 6/25/18  Losartan 50mg, #90, 3 R, 6/25/18    Per Protocol, passed.  Rx sent to

## 2019-07-08 ENCOUNTER — TELEPHONE (OUTPATIENT)
Dept: INTERNAL MEDICINE CLINIC | Facility: CLINIC | Age: 74
End: 2019-07-08

## 2019-07-08 DIAGNOSIS — Z13.29 SCREENING FOR THYROID DISORDER: ICD-10-CM

## 2019-07-08 DIAGNOSIS — Z13.0 SCREENING FOR DISORDER OF BLOOD AND BLOOD-FORMING ORGANS: ICD-10-CM

## 2019-07-08 DIAGNOSIS — Z00.00 ROUTINE GENERAL MEDICAL EXAMINATION AT A HEALTH CARE FACILITY: Primary | ICD-10-CM

## 2019-07-08 DIAGNOSIS — Z13.220 SCREENING FOR LIPID DISORDERS: ICD-10-CM

## 2019-07-08 DIAGNOSIS — Z13.228 SCREENING FOR METABOLIC DISORDER: ICD-10-CM

## 2019-07-08 NOTE — TELEPHONE ENCOUNTER
Future Appointments   Date Time Provider Rain Sotelo   8/12/2019  9:40 AM Cristian Meyers, APRN [facility]     Orders to edward- Pt aware to fast-no call back required

## 2019-08-01 ENCOUNTER — LAB ENCOUNTER (OUTPATIENT)
Dept: LAB | Age: 74
End: 2019-08-01
Attending: NURSE PRACTITIONER
Payer: MEDICARE

## 2019-08-01 DIAGNOSIS — Z00.00 ROUTINE GENERAL MEDICAL EXAMINATION AT A HEALTH CARE FACILITY: ICD-10-CM

## 2019-08-01 DIAGNOSIS — Z13.0 SCREENING FOR DISORDER OF BLOOD AND BLOOD-FORMING ORGANS: ICD-10-CM

## 2019-08-01 DIAGNOSIS — Z13.228 SCREENING FOR METABOLIC DISORDER: ICD-10-CM

## 2019-08-01 DIAGNOSIS — Z13.29 SCREENING FOR THYROID DISORDER: ICD-10-CM

## 2019-08-01 DIAGNOSIS — N40.0 BPH WITHOUT OBSTRUCTION/LOWER URINARY TRACT SYMPTOMS: ICD-10-CM

## 2019-08-01 DIAGNOSIS — R31.29 MICROHEMATURIA: ICD-10-CM

## 2019-08-01 DIAGNOSIS — Z13.220 SCREENING FOR LIPID DISORDERS: ICD-10-CM

## 2019-08-01 LAB
ALBUMIN SERPL-MCNC: 4 G/DL (ref 3.4–5)
ALBUMIN/GLOB SERPL: 1.3 {RATIO} (ref 1–2)
ALP LIVER SERPL-CCNC: 75 U/L (ref 45–117)
ALT SERPL-CCNC: 34 U/L (ref 16–61)
ANION GAP SERPL CALC-SCNC: 8 MMOL/L (ref 0–18)
AST SERPL-CCNC: 23 U/L (ref 15–37)
BASOPHILS # BLD AUTO: 0.03 X10(3) UL (ref 0–0.2)
BASOPHILS NFR BLD AUTO: 0.5 %
BILIRUB SERPL-MCNC: 0.7 MG/DL (ref 0.1–2)
BUN BLD-MCNC: 25 MG/DL (ref 7–18)
BUN/CREAT SERPL: 15.6 (ref 10–20)
CALCIUM BLD-MCNC: 9.4 MG/DL (ref 8.5–10.1)
CHLORIDE SERPL-SCNC: 109 MMOL/L (ref 98–112)
CHOLEST SMN-MCNC: 165 MG/DL (ref ?–200)
CO2 SERPL-SCNC: 25 MMOL/L (ref 21–32)
CREAT BLD-MCNC: 1.6 MG/DL (ref 0.7–1.3)
DEPRECATED RDW RBC AUTO: 42.6 FL (ref 35.1–46.3)
EOSINOPHIL # BLD AUTO: 0.21 X10(3) UL (ref 0–0.7)
EOSINOPHIL NFR BLD AUTO: 3.6 %
ERYTHROCYTE [DISTWIDTH] IN BLOOD BY AUTOMATED COUNT: 15 % (ref 11–15)
GLOBULIN PLAS-MCNC: 3 G/DL (ref 2.8–4.4)
GLUCOSE BLD-MCNC: 97 MG/DL (ref 70–99)
HCT VFR BLD AUTO: 43.1 % (ref 39–53)
HDLC SERPL-MCNC: 35 MG/DL (ref 40–59)
HGB BLD-MCNC: 13.4 G/DL (ref 13–17.5)
IMM GRANULOCYTES # BLD AUTO: 0.02 X10(3) UL (ref 0–1)
IMM GRANULOCYTES NFR BLD: 0.3 %
LDLC SERPL CALC-MCNC: 101 MG/DL (ref ?–100)
LYMPHOCYTES # BLD AUTO: 1.29 X10(3) UL (ref 1–4)
LYMPHOCYTES NFR BLD AUTO: 22 %
M PROTEIN MFR SERPL ELPH: 7 G/DL (ref 6.4–8.2)
MCH RBC QN AUTO: 24.9 PG (ref 26–34)
MCHC RBC AUTO-ENTMCNC: 31.1 G/DL (ref 31–37)
MCV RBC AUTO: 80 FL (ref 80–100)
MONOCYTES # BLD AUTO: 0.6 X10(3) UL (ref 0.1–1)
MONOCYTES NFR BLD AUTO: 10.2 %
NEUTROPHILS # BLD AUTO: 3.72 X10 (3) UL (ref 1.5–7.7)
NEUTROPHILS # BLD AUTO: 3.72 X10(3) UL (ref 1.5–7.7)
NEUTROPHILS NFR BLD AUTO: 63.4 %
NONHDLC SERPL-MCNC: 130 MG/DL (ref ?–130)
OSMOLALITY SERPL CALC.SUM OF ELEC: 298 MOSM/KG (ref 275–295)
PLATELET # BLD AUTO: 252 10(3)UL (ref 150–450)
POTASSIUM SERPL-SCNC: 4.5 MMOL/L (ref 3.5–5.1)
PSA SERPL-MCNC: 1.98 NG/ML (ref ?–4)
RBC # BLD AUTO: 5.39 X10(6)UL (ref 3.8–5.8)
SODIUM SERPL-SCNC: 142 MMOL/L (ref 136–145)
TRIGL SERPL-MCNC: 143 MG/DL (ref 30–149)
TSI SER-ACNC: 2.41 MIU/ML (ref 0.36–3.74)
VLDLC SERPL CALC-MCNC: 29 MG/DL (ref 0–30)
WBC # BLD AUTO: 5.9 X10(3) UL (ref 4–11)

## 2019-08-01 PROCEDURE — 36415 COLL VENOUS BLD VENIPUNCTURE: CPT

## 2019-08-01 PROCEDURE — 85025 COMPLETE CBC W/AUTO DIFF WBC: CPT

## 2019-08-01 PROCEDURE — 80053 COMPREHEN METABOLIC PANEL: CPT

## 2019-08-01 PROCEDURE — 84153 ASSAY OF PSA TOTAL: CPT

## 2019-08-01 PROCEDURE — 84443 ASSAY THYROID STIM HORMONE: CPT

## 2019-08-01 PROCEDURE — 80061 LIPID PANEL: CPT

## 2019-08-09 ENCOUNTER — MA CHART PREP (OUTPATIENT)
Dept: FAMILY MEDICINE CLINIC | Facility: CLINIC | Age: 74
End: 2019-08-09

## 2019-08-14 ENCOUNTER — OFFICE VISIT (OUTPATIENT)
Dept: INTERNAL MEDICINE CLINIC | Facility: CLINIC | Age: 74
End: 2019-08-14
Payer: MEDICARE

## 2019-08-14 VITALS
HEART RATE: 64 BPM | HEIGHT: 67 IN | WEIGHT: 229 LBS | SYSTOLIC BLOOD PRESSURE: 126 MMHG | BODY MASS INDEX: 35.94 KG/M2 | DIASTOLIC BLOOD PRESSURE: 80 MMHG | RESPIRATION RATE: 16 BRPM

## 2019-08-14 DIAGNOSIS — R31.29 MICROHEMATURIA: ICD-10-CM

## 2019-08-14 DIAGNOSIS — K20.90 ESOPHAGITIS: ICD-10-CM

## 2019-08-14 DIAGNOSIS — R35.0 BENIGN PROSTATIC HYPERPLASIA WITH URINARY FREQUENCY: ICD-10-CM

## 2019-08-14 DIAGNOSIS — N52.9 IMPOTENCE: ICD-10-CM

## 2019-08-14 DIAGNOSIS — E66.01 SEVERE OBESITY (BMI 35.0-39.9) WITH COMORBIDITY (HCC): Chronic | ICD-10-CM

## 2019-08-14 DIAGNOSIS — I10 ESSENTIAL HYPERTENSION: ICD-10-CM

## 2019-08-14 DIAGNOSIS — J30.1 SEASONAL ALLERGIC RHINITIS DUE TO POLLEN: ICD-10-CM

## 2019-08-14 DIAGNOSIS — E78.00 PURE HYPERCHOLESTEROLEMIA: ICD-10-CM

## 2019-08-14 DIAGNOSIS — N40.1 BENIGN PROSTATIC HYPERPLASIA WITH URINARY FREQUENCY: ICD-10-CM

## 2019-08-14 DIAGNOSIS — Z12.11 SCREEN FOR COLON CANCER: ICD-10-CM

## 2019-08-14 DIAGNOSIS — I77.819 ECTASIS AORTA (HCC): ICD-10-CM

## 2019-08-14 DIAGNOSIS — M17.0 BILATERAL PRIMARY OSTEOARTHRITIS OF KNEE: ICD-10-CM

## 2019-08-14 DIAGNOSIS — H90.0 CONDUCTIVE HEARING LOSS, BILATERAL: ICD-10-CM

## 2019-08-14 DIAGNOSIS — L98.9 SKIN LESION: ICD-10-CM

## 2019-08-14 DIAGNOSIS — N28.9 RENAL PARENCHYMAL DISEASE: ICD-10-CM

## 2019-08-14 DIAGNOSIS — N18.30 CKD (CHRONIC KIDNEY DISEASE) STAGE 3, GFR 30-59 ML/MIN (HCC): ICD-10-CM

## 2019-08-14 DIAGNOSIS — N28.1 RENAL CYST: ICD-10-CM

## 2019-08-14 DIAGNOSIS — R31.1 BENIGN ESSENTIAL MICROSCOPIC HEMATURIA: ICD-10-CM

## 2019-08-14 DIAGNOSIS — Z00.00 ENCOUNTER FOR ANNUAL HEALTH EXAMINATION: Primary | ICD-10-CM

## 2019-08-14 PROBLEM — E66.811 CLASS 1 OBESITY DUE TO EXCESS CALORIES WITHOUT SERIOUS COMORBIDITY WITH BODY MASS INDEX (BMI) OF 34.0 TO 34.9 IN ADULT: Status: RESOLVED | Noted: 2017-06-12 | Resolved: 2019-08-14

## 2019-08-14 PROBLEM — E66.09 CLASS 1 OBESITY DUE TO EXCESS CALORIES WITHOUT SERIOUS COMORBIDITY WITH BODY MASS INDEX (BMI) OF 34.0 TO 34.9 IN ADULT: Status: RESOLVED | Noted: 2017-06-12 | Resolved: 2019-08-14

## 2019-08-14 PROCEDURE — 96160 PT-FOCUSED HLTH RISK ASSMT: CPT | Performed by: NURSE PRACTITIONER

## 2019-08-14 PROCEDURE — G0439 PPPS, SUBSEQ VISIT: HCPCS | Performed by: NURSE PRACTITIONER

## 2019-08-14 PROCEDURE — 99397 PER PM REEVAL EST PAT 65+ YR: CPT | Performed by: NURSE PRACTITIONER

## 2019-08-14 RX ORDER — AMLODIPINE BESYLATE 5 MG/1
5 TABLET ORAL DAILY
Qty: 90 TABLET | Refills: 3 | Status: SHIPPED | OUTPATIENT
Start: 2019-08-14 | End: 2020-08-28

## 2019-08-14 RX ORDER — ATORVASTATIN CALCIUM 10 MG/1
10 TABLET, FILM COATED ORAL NIGHTLY
Qty: 90 TABLET | Refills: 3 | Status: SHIPPED | OUTPATIENT
Start: 2019-08-14 | End: 2020-08-28

## 2019-08-14 RX ORDER — LOSARTAN POTASSIUM 50 MG/1
50 TABLET ORAL DAILY
Qty: 90 TABLET | Refills: 3 | Status: SHIPPED | OUTPATIENT
Start: 2019-08-14 | End: 2020-08-28

## 2019-08-14 NOTE — PROGRESS NOTES
HPI:   Kike Brenner is a 69year old male who presents for a MA (Medicare Advantage) [facility] (Once per calendar year). Pure hypercholesterolemia  Stable  lipitor  Diet, exercise, weight loss and lifestyle modification.       Essential hypertension New hearing aids  Doing well. Seasonal allergic rhinitis due to pollen  Stable  Cont to monitor.      His last annual assessment has been over 1 year: Annual Physical due on 06/25/2019         Fall/Risk Assessment   He has been screened for Falls and i hypertension     Microhematuria     Impotence     Ectasis aorta (HCC)     CKD (chronic kidney disease) stage 3, GFR 30-59 ml/min (HCC)     Renal cyst     Renal parenchymal disease     Bilateral primary osteoarthritis of knee     Allergic rhinitis     Esoph colonoscopy (2002 2009); upper gi endoscopy,exam (2009); and tonsillectomy (1957). His family history includes Cancer in his mother; Hypertension in his father and mother. SOCIAL HISTORY:   He  reports that he has never smoked.  He has never used smoke Neck: Supple, symmetrical, trachea midline, no adenopathy, thyroid: not enlarged, no JVD   Back:   Symmetric, no curvature, ROM normal, no CVA tenderness   Lungs:   Clear to auscultation bilaterally, respirations unlabored   Chest Wall:  No tenderness or benefit from EGD at time of colonoscopy. Bilateral primary osteoarthritis of knee  Stable  Continue to monitor.       Microhematuria  Stable   Per Dr Krysta Barba    CKD (chronic kidney disease) stage 3, GFR 30-59 ml/min ([facility])  Has seen Dr Asehly Workman in the past. KENJI Diallo, 8/14/2019     General Health     In the past six months, have you lost more than 10 pounds without trying?: 2 - No  Has your appetite been poor?: No  How does the patient maintain a good energy level?: Appropriate Exercise  How woul (Prevnar)  Covered Once after 65 07/23/2015 Please get once after your 65th birthday    Pneumococcal 23 (Pneumovax)  Covered Once after 65 09/14/2012 Please get once after your 65th birthday    Hepatitis B for Moderate/High Risk No vaccine history found Me

## 2019-08-14 NOTE — PATIENT INSTRUCTIONS
Amna Alvarez's SCREENING SCHEDULE   Tests on this list are recommended by your physician but may not be covered, or covered at this frequency, by your insurer. Please check with your insurance carrier before scheduling to verify coverage.     Christine Jara old and have smoked more than 100 cigarettes in their lifetime   • Anyone with a family history    Colorectal Cancer Screening Covered up to Age 76     Colonoscopy Screen   Covered every 10 years- more often if abnormal Colonoscopy due on 01/01/2019 Update Tetanus Toxoid- Only covered with a cut with metal- TD and TDaP Not covered by Medicare Part B) No orders found for this or any previous visit.  This may be covered with your prescription benefits, but Medicare does not cover unless Medically needed    Zost

## 2019-09-17 ENCOUNTER — APPOINTMENT (OUTPATIENT)
Dept: LAB | Age: 74
End: 2019-09-17
Attending: NURSE PRACTITIONER
Payer: MEDICARE

## 2019-09-17 DIAGNOSIS — N18.30 CKD (CHRONIC KIDNEY DISEASE) STAGE 3, GFR 30-59 ML/MIN (HCC): ICD-10-CM

## 2019-09-17 LAB
ALBUMIN SERPL-MCNC: 3.9 G/DL (ref 3.4–5)
ALBUMIN/GLOB SERPL: 1.4 {RATIO} (ref 1–2)
ALP LIVER SERPL-CCNC: 72 U/L (ref 45–117)
ALT SERPL-CCNC: 26 U/L (ref 16–61)
ANION GAP SERPL CALC-SCNC: 6 MMOL/L (ref 0–18)
AST SERPL-CCNC: 21 U/L (ref 15–37)
BILIRUB SERPL-MCNC: 0.5 MG/DL (ref 0.1–2)
BILIRUB UR QL STRIP.AUTO: NEGATIVE
BUN BLD-MCNC: 19 MG/DL (ref 7–18)
BUN/CREAT SERPL: 13.4 (ref 10–20)
CALCIUM BLD-MCNC: 9.2 MG/DL (ref 8.5–10.1)
CHLORIDE SERPL-SCNC: 109 MMOL/L (ref 98–112)
CLARITY UR REFRACT.AUTO: CLEAR
CO2 SERPL-SCNC: 23 MMOL/L (ref 21–32)
COLOR UR AUTO: YELLOW
CREAT BLD-MCNC: 1.42 MG/DL (ref 0.7–1.3)
GLOBULIN PLAS-MCNC: 2.7 G/DL (ref 2.8–4.4)
GLUCOSE BLD-MCNC: 85 MG/DL (ref 70–99)
GLUCOSE UR STRIP.AUTO-MCNC: NEGATIVE MG/DL
HYALINE CASTS #/AREA URNS AUTO: PRESENT /LPF
KETONES UR STRIP.AUTO-MCNC: NEGATIVE MG/DL
LEUKOCYTE ESTERASE UR QL STRIP.AUTO: NEGATIVE
M PROTEIN MFR SERPL ELPH: 6.6 G/DL (ref 6.4–8.2)
NITRITE UR QL STRIP.AUTO: NEGATIVE
OSMOLALITY SERPL CALC.SUM OF ELEC: 288 MOSM/KG (ref 275–295)
PH UR STRIP.AUTO: 6 [PH] (ref 4.5–8)
POTASSIUM SERPL-SCNC: 4.4 MMOL/L (ref 3.5–5.1)
PROT UR STRIP.AUTO-MCNC: 100 MG/DL
SODIUM SERPL-SCNC: 138 MMOL/L (ref 136–145)
SP GR UR STRIP.AUTO: 1.02 (ref 1–1.03)
UROBILINOGEN UR STRIP.AUTO-MCNC: <2 MG/DL

## 2019-09-17 PROCEDURE — 81001 URINALYSIS AUTO W/SCOPE: CPT

## 2019-09-17 PROCEDURE — 80053 COMPREHEN METABOLIC PANEL: CPT

## 2019-09-17 PROCEDURE — 36415 COLL VENOUS BLD VENIPUNCTURE: CPT

## 2020-01-10 ENCOUNTER — PATIENT OUTREACH (OUTPATIENT)
Dept: FAMILY MEDICINE CLINIC | Facility: CLINIC | Age: 75
End: 2020-01-10

## 2020-08-24 ENCOUNTER — TELEPHONE (OUTPATIENT)
Dept: INTERNAL MEDICINE CLINIC | Facility: CLINIC | Age: 75
End: 2020-08-24

## 2020-08-24 DIAGNOSIS — E78.00 PURE HYPERCHOLESTEROLEMIA: ICD-10-CM

## 2020-08-24 DIAGNOSIS — Z13.0 SCREENING FOR DISORDER OF BLOOD AND BLOOD-FORMING ORGANS: ICD-10-CM

## 2020-08-24 DIAGNOSIS — Z00.00 ENCOUNTER FOR ANNUAL HEALTH EXAMINATION: Primary | ICD-10-CM

## 2020-08-24 DIAGNOSIS — I10 ESSENTIAL HYPERTENSION: ICD-10-CM

## 2020-08-24 DIAGNOSIS — Z13.29 SCREENING FOR THYROID DISORDER: ICD-10-CM

## 2020-08-24 NOTE — TELEPHONE ENCOUNTER
Future Appointments   Date Time Provider Rain Ashleigh   9/14/2020 11:00 AM Shikha Stubbs, APRN [facility]     Orders to edward- Pt informed that labs need to be completed no sooner than 2 weeks prior to the appt.  Pt aware to fast-no call lesia

## 2020-08-28 RX ORDER — AMLODIPINE BESYLATE 5 MG/1
TABLET ORAL
Qty: 90 TABLET | Refills: 1 | Status: SHIPPED | OUTPATIENT
Start: 2020-08-28 | End: 2021-01-27

## 2020-08-28 RX ORDER — LOSARTAN POTASSIUM 50 MG/1
TABLET ORAL
Qty: 90 TABLET | Refills: 1 | Status: SHIPPED | OUTPATIENT
Start: 2020-08-28 | End: 2021-01-27

## 2020-08-28 RX ORDER — ATORVASTATIN CALCIUM 10 MG/1
TABLET, FILM COATED ORAL
Qty: 90 TABLET | Refills: 1 | Status: SHIPPED | OUTPATIENT
Start: 2020-08-28 | End: 2021-01-27

## 2020-08-28 NOTE — TELEPHONE ENCOUNTER
Last Ov: 8/14/19, SD, CPE  Last labs: CMP 9/17/19  Last Rx: atorvastatin 10mg, #90, 3R 8/14/19     Future Appointments   Date Time Provider Rain Sotelo   9/15/2020  1:20 PM KENJI Thapa [facility]   9/17/2020 10:45 AM González Wolfe

## 2020-09-01 NOTE — TELEPHONE ENCOUNTER
Bloodwork orders placed to THE [facility]  lab for upcoming physical per protocol.

## 2020-09-04 ENCOUNTER — LAB ENCOUNTER (OUTPATIENT)
Dept: LAB | Age: 75
End: 2020-09-04
Attending: NURSE PRACTITIONER
Payer: MEDICARE

## 2020-09-04 DIAGNOSIS — Z13.29 SCREENING FOR THYROID DISORDER: ICD-10-CM

## 2020-09-04 DIAGNOSIS — I10 ESSENTIAL HYPERTENSION: ICD-10-CM

## 2020-09-04 DIAGNOSIS — Z00.00 ENCOUNTER FOR ANNUAL HEALTH EXAMINATION: ICD-10-CM

## 2020-09-04 DIAGNOSIS — E78.00 PURE HYPERCHOLESTEROLEMIA: ICD-10-CM

## 2020-09-04 DIAGNOSIS — Z13.0 SCREENING FOR DISORDER OF BLOOD AND BLOOD-FORMING ORGANS: ICD-10-CM

## 2020-09-04 LAB
ALBUMIN SERPL-MCNC: 3.8 G/DL (ref 3.4–5)
ALBUMIN/GLOB SERPL: 1.3 {RATIO} (ref 1–2)
ALP LIVER SERPL-CCNC: 73 U/L (ref 45–117)
ALT SERPL-CCNC: 24 U/L (ref 16–61)
ANION GAP SERPL CALC-SCNC: 5 MMOL/L (ref 0–18)
AST SERPL-CCNC: 18 U/L (ref 15–37)
BASOPHILS # BLD AUTO: 0.04 X10(3) UL (ref 0–0.2)
BASOPHILS NFR BLD AUTO: 0.7 %
BILIRUB SERPL-MCNC: 0.6 MG/DL (ref 0.1–2)
BUN BLD-MCNC: 17 MG/DL (ref 7–18)
BUN/CREAT SERPL: 12.4 (ref 10–20)
CALCIUM BLD-MCNC: 9.5 MG/DL (ref 8.5–10.1)
CHLORIDE SERPL-SCNC: 110 MMOL/L (ref 98–112)
CHOLEST SMN-MCNC: 143 MG/DL (ref ?–200)
CO2 SERPL-SCNC: 25 MMOL/L (ref 21–32)
CREAT BLD-MCNC: 1.37 MG/DL (ref 0.7–1.3)
DEPRECATED RDW RBC AUTO: 46.9 FL (ref 35.1–46.3)
EOSINOPHIL # BLD AUTO: 0.31 X10(3) UL (ref 0–0.7)
EOSINOPHIL NFR BLD AUTO: 5.4 %
ERYTHROCYTE [DISTWIDTH] IN BLOOD BY AUTOMATED COUNT: 14.6 % (ref 11–15)
GLOBULIN PLAS-MCNC: 2.9 G/DL (ref 2.8–4.4)
GLUCOSE BLD-MCNC: 88 MG/DL (ref 70–99)
HCT VFR BLD AUTO: 50 % (ref 39–53)
HDLC SERPL-MCNC: 33 MG/DL (ref 40–59)
HGB BLD-MCNC: 15.4 G/DL (ref 13–17.5)
IMM GRANULOCYTES # BLD AUTO: 0.02 X10(3) UL (ref 0–1)
IMM GRANULOCYTES NFR BLD: 0.3 %
LDLC SERPL CALC-MCNC: 69 MG/DL (ref ?–100)
LYMPHOCYTES # BLD AUTO: 1.41 X10(3) UL (ref 1–4)
LYMPHOCYTES NFR BLD AUTO: 24.5 %
M PROTEIN MFR SERPL ELPH: 6.7 G/DL (ref 6.4–8.2)
MCH RBC QN AUTO: 26.9 PG (ref 26–34)
MCHC RBC AUTO-ENTMCNC: 30.8 G/DL (ref 31–37)
MCV RBC AUTO: 87.4 FL (ref 80–100)
MONOCYTES # BLD AUTO: 0.55 X10(3) UL (ref 0.1–1)
MONOCYTES NFR BLD AUTO: 9.6 %
NEUTROPHILS # BLD AUTO: 3.42 X10 (3) UL (ref 1.5–7.7)
NEUTROPHILS # BLD AUTO: 3.42 X10(3) UL (ref 1.5–7.7)
NEUTROPHILS NFR BLD AUTO: 59.5 %
NONHDLC SERPL-MCNC: 110 MG/DL (ref ?–130)
OSMOLALITY SERPL CALC.SUM OF ELEC: 291 MOSM/KG (ref 275–295)
PATIENT FASTING Y/N/NP: YES
PATIENT FASTING Y/N/NP: YES
PLATELET # BLD AUTO: 209 10(3)UL (ref 150–450)
POTASSIUM SERPL-SCNC: 4.5 MMOL/L (ref 3.5–5.1)
RBC # BLD AUTO: 5.72 X10(6)UL (ref 3.8–5.8)
SODIUM SERPL-SCNC: 140 MMOL/L (ref 136–145)
TRIGL SERPL-MCNC: 205 MG/DL (ref 30–149)
TSI SER-ACNC: 2.65 MIU/ML (ref 0.36–3.74)
VLDLC SERPL CALC-MCNC: 41 MG/DL (ref 0–30)
WBC # BLD AUTO: 5.8 X10(3) UL (ref 4–11)

## 2020-09-04 PROCEDURE — 80053 COMPREHEN METABOLIC PANEL: CPT

## 2020-09-04 PROCEDURE — 84443 ASSAY THYROID STIM HORMONE: CPT

## 2020-09-04 PROCEDURE — 85025 COMPLETE CBC W/AUTO DIFF WBC: CPT

## 2020-09-04 PROCEDURE — 80061 LIPID PANEL: CPT

## 2020-09-04 PROCEDURE — 36415 COLL VENOUS BLD VENIPUNCTURE: CPT

## 2020-09-15 ENCOUNTER — OFFICE VISIT (OUTPATIENT)
Dept: INTERNAL MEDICINE CLINIC | Facility: CLINIC | Age: 75
End: 2020-09-15
Payer: MEDICARE

## 2020-09-15 VITALS
SYSTOLIC BLOOD PRESSURE: 116 MMHG | DIASTOLIC BLOOD PRESSURE: 80 MMHG | HEIGHT: 67 IN | WEIGHT: 225.63 LBS | RESPIRATION RATE: 18 BRPM | HEART RATE: 60 BPM | BODY MASS INDEX: 35.41 KG/M2 | TEMPERATURE: 98 F

## 2020-09-15 DIAGNOSIS — K20.90 ESOPHAGITIS: ICD-10-CM

## 2020-09-15 DIAGNOSIS — N40.1 BENIGN PROSTATIC HYPERPLASIA WITH URINARY FREQUENCY: ICD-10-CM

## 2020-09-15 DIAGNOSIS — R31.1 BENIGN ESSENTIAL MICROSCOPIC HEMATURIA: ICD-10-CM

## 2020-09-15 DIAGNOSIS — N28.1 RENAL CYST: ICD-10-CM

## 2020-09-15 DIAGNOSIS — Z00.00 ENCOUNTER FOR ANNUAL HEALTH EXAMINATION: Primary | ICD-10-CM

## 2020-09-15 DIAGNOSIS — E66.01 SEVERE OBESITY (BMI 35.0-39.9) WITH COMORBIDITY (HCC): Chronic | ICD-10-CM

## 2020-09-15 DIAGNOSIS — N18.30 CKD (CHRONIC KIDNEY DISEASE) STAGE 3, GFR 30-59 ML/MIN (HCC): ICD-10-CM

## 2020-09-15 DIAGNOSIS — E78.00 PURE HYPERCHOLESTEROLEMIA: ICD-10-CM

## 2020-09-15 DIAGNOSIS — R31.29 MICROHEMATURIA: ICD-10-CM

## 2020-09-15 DIAGNOSIS — H90.0 CONDUCTIVE HEARING LOSS, BILATERAL: ICD-10-CM

## 2020-09-15 DIAGNOSIS — I10 ESSENTIAL HYPERTENSION: ICD-10-CM

## 2020-09-15 DIAGNOSIS — M17.0 BILATERAL PRIMARY OSTEOARTHRITIS OF KNEE: ICD-10-CM

## 2020-09-15 DIAGNOSIS — J30.1 SEASONAL ALLERGIC RHINITIS DUE TO POLLEN: ICD-10-CM

## 2020-09-15 DIAGNOSIS — I77.819 ECTASIS AORTA (HCC): ICD-10-CM

## 2020-09-15 DIAGNOSIS — R35.0 BENIGN PROSTATIC HYPERPLASIA WITH URINARY FREQUENCY: ICD-10-CM

## 2020-09-15 DIAGNOSIS — N28.9 RENAL PARENCHYMAL DISEASE: ICD-10-CM

## 2020-09-15 PROCEDURE — 99397 PER PM REEVAL EST PAT 65+ YR: CPT | Performed by: NURSE PRACTITIONER

## 2020-09-15 PROCEDURE — 3074F SYST BP LT 130 MM HG: CPT | Performed by: NURSE PRACTITIONER

## 2020-09-15 PROCEDURE — 3079F DIAST BP 80-89 MM HG: CPT | Performed by: NURSE PRACTITIONER

## 2020-09-15 PROCEDURE — 3008F BODY MASS INDEX DOCD: CPT | Performed by: NURSE PRACTITIONER

## 2020-09-15 PROCEDURE — G0439 PPPS, SUBSEQ VISIT: HCPCS | Performed by: NURSE PRACTITIONER

## 2020-09-15 PROCEDURE — 96160 PT-FOCUSED HLTH RISK ASSMT: CPT | Performed by: NURSE PRACTITIONER

## 2020-09-15 NOTE — PROGRESS NOTES
HPI:   Jefferson Bai is a 70year old male who presents for a MA (Medicare Advantage) [facility] (Once per calendar year). Benign essential microscopic hematuria  Stable  Monitored by Dr Marcellus Vázquez  Has upcoming appt this week.      Renal parenchymal diseas Little interest or pleasure in doing things (over the last two weeks)?: Not at all  Feeling down, depressed, or hopeless (over the last two weeks)?: Not at all  PHQ-2 SCORE: 0     Advanced Directive:   He does NOT have a Living Will on file in [address] [place] [address] Carlos Enrique Ling CREATSERUM 1.37 (H) 09/04/2020    GLU 88 09/04/2020        CBC  (most recent labs)   Lab Results   Component Value Date    WBC 5.8 09/04/2020    HGB 15.4 09/04/2020    .0 09/04/2020        ALLERGIES:   He is allergic to aspirin and ibuprofen.     Carissa Morley arthralgias and gait problem. Skin: Negative for color change and rash. Neurological: Negative for dizziness, syncope, weakness, numbness, tingling and headaches. Hematological: Negative for adenopathy. Does not bruise/bleed easily.    Psychiatric/Beh Vaccination History     Immunization History   Administered Date(s) Administered   • FLU VAC High Dose 65 YRS & Older PRSV Free (10738) 11/04/2015, 09/27/2018, 09/27/2019   • Fluzone Vaccine Medicare () 09/13/2017   • Influenza 11/05/2007, hypercholesterolemia  Stable   Lipitor.   LDL to goal        Conductive hearing loss, bilateral  Stable  Hearing aids bilaterally   Some cerumen removed today  Discussed annual ENT visit for impacted cerumen removal.          Diet assessment: good     PLAN: Annually: Diabetics, FHx Glaucoma, AA>50, > 65 No flowsheet data found.     Prostate Cancer Screening      PSA  Annually PSA due on 08/01/2021  Update Health Maintenance if applicable     Immunizations (Update Immunization Activity if applicable)

## 2020-09-23 ENCOUNTER — PATIENT MESSAGE (OUTPATIENT)
Dept: INTERNAL MEDICINE CLINIC | Facility: CLINIC | Age: 75
End: 2020-09-23

## 2020-09-23 NOTE — TELEPHONE ENCOUNTER
From: Lorena Love  To: KENJI Rust  Sent: 9/23/2020 3:54 PM CDT  Subject: Other    I received a flu from [address] on Friday Sept 18, 2020. I asked them to notify you and I do not see it noted on MyChart.     RX # [id number]    What

## 2021-01-27 RX ORDER — ATORVASTATIN CALCIUM 10 MG/1
TABLET, FILM COATED ORAL
Qty: 90 TABLET | Refills: 1 | Status: SHIPPED | OUTPATIENT
Start: 2021-01-27 | End: 2021-06-26

## 2021-01-27 RX ORDER — LOSARTAN POTASSIUM 50 MG/1
TABLET ORAL
Qty: 90 TABLET | Refills: 1 | Status: SHIPPED | OUTPATIENT
Start: 2021-01-27 | End: 2021-06-26

## 2021-01-27 RX ORDER — AMLODIPINE BESYLATE 5 MG/1
TABLET ORAL
Qty: 90 TABLET | Refills: 1 | Status: SHIPPED | OUTPATIENT
Start: 2021-01-27 | End: 2021-06-26

## 2021-03-09 DIAGNOSIS — Z23 NEED FOR VACCINATION: ICD-10-CM

## 2021-03-20 ENCOUNTER — PATIENT MESSAGE (OUTPATIENT)
Dept: INTERNAL MEDICINE CLINIC | Facility: CLINIC | Age: 76
End: 2021-03-20

## 2021-03-22 NOTE — TELEPHONE ENCOUNTER
From: Lorena Love  To: KENJI Rust  Sent: 3/20/2021 3:21 PM CDT  Subject: Other    can this record of covid vaccination be added to my medical history?

## 2021-06-25 NOTE — TELEPHONE ENCOUNTER
Last OV: 9/15/20 annual    Latest RX: all meds filled 90 tabs 1 refill on 1/27/21    Per protocol, statin passed. Rx sent. Per protocol, BP meds failed due to Appointment in past 6 or next 3 months. Rx pending.

## 2021-06-26 RX ORDER — AMLODIPINE BESYLATE 5 MG/1
5 TABLET ORAL DAILY
Qty: 90 TABLET | Refills: 0 | Status: SHIPPED | OUTPATIENT
Start: 2021-06-26 | End: 2021-09-08

## 2021-06-26 RX ORDER — ATORVASTATIN CALCIUM 10 MG/1
TABLET, FILM COATED ORAL
Qty: 90 TABLET | Refills: 1 | Status: SHIPPED | OUTPATIENT
Start: 2021-06-26 | End: 2021-09-09

## 2021-06-26 RX ORDER — LOSARTAN POTASSIUM 50 MG/1
50 TABLET ORAL DAILY
Qty: 90 TABLET | Refills: 0 | Status: SHIPPED | OUTPATIENT
Start: 2021-06-26 | End: 2021-09-08

## 2021-09-08 RX ORDER — LOSARTAN POTASSIUM 50 MG/1
TABLET ORAL
Qty: 90 TABLET | Refills: 0 | Status: SHIPPED | OUTPATIENT
Start: 2021-09-08 | End: 2021-11-22

## 2021-09-08 RX ORDER — AMLODIPINE BESYLATE 5 MG/1
TABLET ORAL
Qty: 90 TABLET | Refills: 0 | Status: SHIPPED | OUTPATIENT
Start: 2021-09-08 | End: 2021-11-22

## 2021-09-08 NOTE — TELEPHONE ENCOUNTER
Last OV: 9/15/20- annual PE- SD    No future appointments.      Latest labs: 9/4/20- CBC, CMP, Lipid and TSH    Latest RX: both meds last filled for 90 tabs 0 refills on 6/26/21    Per protocol, both medication failed due to CMP or BMP in past 12 months and

## 2021-09-09 ENCOUNTER — TELEPHONE (OUTPATIENT)
Dept: INTERNAL MEDICINE CLINIC | Facility: CLINIC | Age: 76
End: 2021-09-09

## 2021-09-09 DIAGNOSIS — Z12.5 SCREENING FOR MALIGNANT NEOPLASM OF PROSTATE: ICD-10-CM

## 2021-09-09 DIAGNOSIS — Z00.00 ROUTINE GENERAL MEDICAL EXAMINATION AT A HEALTH CARE FACILITY: Primary | ICD-10-CM

## 2021-09-09 DIAGNOSIS — E78.00 PURE HYPERCHOLESTEROLEMIA: ICD-10-CM

## 2021-09-09 DIAGNOSIS — I10 ESSENTIAL HYPERTENSION: ICD-10-CM

## 2021-09-09 NOTE — TELEPHONE ENCOUNTER
Future Appointments   Date Time Provider Rain Sotelo   9/28/2021  3:00 PM KENJI Carranza [facility]         Orders to THE [facility]  aware must fast no call back required

## 2021-09-09 NOTE — TELEPHONE ENCOUNTER
Pt scheduled on below for cpe   Future Appointments   Date Time Provider Rain Sotelo   9/28/2021  3:00 PM Erlene Days, APRN [facility]     Pt sent the below message through [employer]. Please advise.     Is there a reason my Atorvastatin was

## 2021-09-10 RX ORDER — ATORVASTATIN CALCIUM 10 MG/1
10 TABLET, FILM COATED ORAL NIGHTLY
Qty: 90 TABLET | Refills: 0 | Status: SHIPPED | OUTPATIENT
Start: 2021-09-10 | End: 2021-11-22

## 2021-09-10 NOTE — TELEPHONE ENCOUNTER
Last OV: 9/15/20- annual- SD    Future Appointments   Date Time Provider Rain Ashleigh   9/28/2021  3:00 PM KENJI Marcum [facility]        Latest labs: pending screening labs were ordered.  9/4/20- CBC, CMP, Lipid and TSH    Latest RX:

## 2021-09-20 ENCOUNTER — LAB ENCOUNTER (OUTPATIENT)
Dept: LAB | Age: 76
End: 2021-09-20
Attending: NURSE PRACTITIONER
Payer: MEDICARE

## 2021-09-20 DIAGNOSIS — Z12.5 SCREENING FOR MALIGNANT NEOPLASM OF PROSTATE: ICD-10-CM

## 2021-09-20 DIAGNOSIS — I10 ESSENTIAL HYPERTENSION: ICD-10-CM

## 2021-09-20 DIAGNOSIS — E78.00 PURE HYPERCHOLESTEROLEMIA: ICD-10-CM

## 2021-09-20 DIAGNOSIS — Z00.00 ROUTINE GENERAL MEDICAL EXAMINATION AT A HEALTH CARE FACILITY: ICD-10-CM

## 2021-09-20 LAB
ALBUMIN SERPL-MCNC: 3.8 G/DL (ref 3.4–5)
ALBUMIN/GLOB SERPL: 1.2 {RATIO} (ref 1–2)
ALP LIVER SERPL-CCNC: 78 U/L
ALT SERPL-CCNC: 25 U/L
ANION GAP SERPL CALC-SCNC: 4 MMOL/L (ref 0–18)
AST SERPL-CCNC: 18 U/L (ref 15–37)
BASOPHILS # BLD AUTO: 0.05 X10(3) UL (ref 0–0.2)
BASOPHILS NFR BLD AUTO: 0.8 %
BILIRUB SERPL-MCNC: 0.7 MG/DL (ref 0.1–2)
BUN BLD-MCNC: 24 MG/DL (ref 7–18)
CALCIUM BLD-MCNC: 9.2 MG/DL (ref 8.5–10.1)
CHLORIDE SERPL-SCNC: 109 MMOL/L (ref 98–112)
CHOLEST SERPL-MCNC: 176 MG/DL (ref ?–200)
CO2 SERPL-SCNC: 25 MMOL/L (ref 21–32)
COMPLEXED PSA SERPL-MCNC: 1.89 NG/ML (ref ?–4)
CREAT BLD-MCNC: 1.32 MG/DL
EOSINOPHIL # BLD AUTO: 0.2 X10(3) UL (ref 0–0.7)
EOSINOPHIL NFR BLD AUTO: 3.3 %
ERYTHROCYTE [DISTWIDTH] IN BLOOD BY AUTOMATED COUNT: 11.9 %
GLOBULIN PLAS-MCNC: 3.3 G/DL (ref 2.8–4.4)
GLUCOSE BLD-MCNC: 104 MG/DL (ref 70–99)
HCT VFR BLD AUTO: 50.8 %
HDLC SERPL-MCNC: 36 MG/DL (ref 40–59)
HGB BLD-MCNC: 17.9 G/DL
IMM GRANULOCYTES # BLD AUTO: 0.02 X10(3) UL (ref 0–1)
IMM GRANULOCYTES NFR BLD: 0.3 %
LDLC SERPL CALC-MCNC: 109 MG/DL (ref ?–100)
LYMPHOCYTES # BLD AUTO: 1.34 X10(3) UL (ref 1–4)
LYMPHOCYTES NFR BLD AUTO: 21.9 %
MCH RBC QN AUTO: 31.2 PG (ref 26–34)
MCHC RBC AUTO-ENTMCNC: 35.2 G/DL (ref 31–37)
MCV RBC AUTO: 88.7 FL
MONOCYTES # BLD AUTO: 0.65 X10(3) UL (ref 0.1–1)
MONOCYTES NFR BLD AUTO: 10.6 %
NEUTROPHILS # BLD AUTO: 3.87 X10 (3) UL (ref 1.5–7.7)
NEUTROPHILS # BLD AUTO: 3.87 X10(3) UL (ref 1.5–7.7)
NEUTROPHILS NFR BLD AUTO: 63.1 %
NONHDLC SERPL-MCNC: 140 MG/DL (ref ?–130)
OSMOLALITY SERPL CALC.SUM OF ELEC: 290 MOSM/KG (ref 275–295)
PATIENT FASTING Y/N/NP: YES
PATIENT FASTING Y/N/NP: YES
PLATELET # BLD AUTO: 201 10(3)UL (ref 150–450)
POTASSIUM SERPL-SCNC: 4.3 MMOL/L (ref 3.5–5.1)
PROT SERPL-MCNC: 7.1 G/DL (ref 6.4–8.2)
RBC # BLD AUTO: 5.73 X10(6)UL
SODIUM SERPL-SCNC: 138 MMOL/L (ref 136–145)
TRIGL SERPL-MCNC: 178 MG/DL (ref 30–149)
TSI SER-ACNC: 1.9 MIU/ML (ref 0.36–3.74)
VLDLC SERPL CALC-MCNC: 30 MG/DL (ref 0–30)
WBC # BLD AUTO: 6.1 X10(3) UL (ref 4–11)

## 2021-09-20 PROCEDURE — 80053 COMPREHEN METABOLIC PANEL: CPT

## 2021-09-20 PROCEDURE — 84443 ASSAY THYROID STIM HORMONE: CPT

## 2021-09-20 PROCEDURE — 85025 COMPLETE CBC W/AUTO DIFF WBC: CPT

## 2021-09-20 PROCEDURE — 80061 LIPID PANEL: CPT

## 2021-09-20 PROCEDURE — 36415 COLL VENOUS BLD VENIPUNCTURE: CPT

## 2021-09-28 ENCOUNTER — OFFICE VISIT (OUTPATIENT)
Dept: INTERNAL MEDICINE CLINIC | Facility: CLINIC | Age: 76
End: 2021-09-28
Payer: MEDICARE

## 2021-09-28 VITALS
OXYGEN SATURATION: 96 % | HEIGHT: 67 IN | WEIGHT: 236 LBS | BODY MASS INDEX: 37.04 KG/M2 | DIASTOLIC BLOOD PRESSURE: 66 MMHG | HEART RATE: 64 BPM | RESPIRATION RATE: 18 BRPM | TEMPERATURE: 98 F | SYSTOLIC BLOOD PRESSURE: 118 MMHG

## 2021-09-28 DIAGNOSIS — N40.1 BENIGN PROSTATIC HYPERPLASIA WITH URINARY FREQUENCY: ICD-10-CM

## 2021-09-28 DIAGNOSIS — J30.1 SEASONAL ALLERGIC RHINITIS DUE TO POLLEN: ICD-10-CM

## 2021-09-28 DIAGNOSIS — N28.9 RENAL PARENCHYMAL DISEASE: ICD-10-CM

## 2021-09-28 DIAGNOSIS — I77.819 ECTASIS AORTA (HCC): ICD-10-CM

## 2021-09-28 DIAGNOSIS — R31.29 MICROHEMATURIA: ICD-10-CM

## 2021-09-28 DIAGNOSIS — R35.0 BENIGN PROSTATIC HYPERPLASIA WITH URINARY FREQUENCY: ICD-10-CM

## 2021-09-28 DIAGNOSIS — I10 ESSENTIAL HYPERTENSION: ICD-10-CM

## 2021-09-28 DIAGNOSIS — D58.2 ELEVATED HEMOGLOBIN (HCC): ICD-10-CM

## 2021-09-28 DIAGNOSIS — M17.0 BILATERAL PRIMARY OSTEOARTHRITIS OF KNEE: ICD-10-CM

## 2021-09-28 DIAGNOSIS — N28.1 RENAL CYST: ICD-10-CM

## 2021-09-28 DIAGNOSIS — K20.90 ESOPHAGITIS: ICD-10-CM

## 2021-09-28 DIAGNOSIS — H90.0 CONDUCTIVE HEARING LOSS, BILATERAL: ICD-10-CM

## 2021-09-28 DIAGNOSIS — E66.01 SEVERE OBESITY (BMI 35.0-39.9) WITH COMORBIDITY (HCC): Chronic | ICD-10-CM

## 2021-09-28 DIAGNOSIS — Z00.00 ENCOUNTER FOR ANNUAL HEALTH EXAMINATION: Primary | ICD-10-CM

## 2021-09-28 DIAGNOSIS — N18.31 STAGE 3A CHRONIC KIDNEY DISEASE (HCC): ICD-10-CM

## 2021-09-28 DIAGNOSIS — E78.00 PURE HYPERCHOLESTEROLEMIA: ICD-10-CM

## 2021-09-28 PROCEDURE — G0439 PPPS, SUBSEQ VISIT: HCPCS | Performed by: NURSE PRACTITIONER

## 2021-09-28 PROCEDURE — 99397 PER PM REEVAL EST PAT 65+ YR: CPT | Performed by: NURSE PRACTITIONER

## 2021-09-28 PROCEDURE — 3008F BODY MASS INDEX DOCD: CPT | Performed by: NURSE PRACTITIONER

## 2021-09-28 PROCEDURE — 3078F DIAST BP <80 MM HG: CPT | Performed by: NURSE PRACTITIONER

## 2021-09-28 PROCEDURE — 96160 PT-FOCUSED HLTH RISK ASSMT: CPT | Performed by: NURSE PRACTITIONER

## 2021-09-28 PROCEDURE — 3074F SYST BP LT 130 MM HG: CPT | Performed by: NURSE PRACTITIONER

## 2021-09-28 RX ORDER — VITAMIN B COMPLEX
TABLET ORAL
COMMUNITY

## 2021-09-28 NOTE — PROGRESS NOTES
HPI:   Clemencia Valentine is a 78year old male who presents for a MA (Medicare Advantage) [facility] (Once per calendar year). Pure hypercholesterolemia  LDL up  109  Diet, exercise, weight loss and lifestyle modification. His weight is up.   Not watchin of advance directives standard forms performed Face to Face with patient and Family/surrogate (if present), and forms available to patient in AVS     He does NOT have a Power of  for [employer] on file in [place].    Advance care planning including th to aspirin and ibuprofen. CURRENT MEDICATIONS:   Multiple Vitamins-Minerals (DAILY COMBO MULTI VITAMINS) Oral Tab, Take by mouth. Vit D, Vit C, Zinc  atorvastatin 10 MG Oral Tab, Take 1 tablet (10 mg total) by mouth nightly.   LOSARTAN POTASSIUM 50 MG Or PERRL, conjunctiva/corneas clear, EOM's intact, both eyes   Ears:  Normal TM's and external ear canals, both ears   Nose: Nares normal, septum midline, mucosa normal, no drainage or sinus tenderness   Throat: Lips, mucosa, and tongue normal; teeth and gums visit:    Encounter for annual health examination    Pure hypercholesterolemia  LDL up  109  Diet, exercise, weight loss and lifestyle modification. His weight is up. Not watching his diet. lipitor 10mg.       Essential hypertension  Stable   Losartan 5 recommended by your physician but may not be covered, or covered at this frequency, by your insurer. Please check with your insurance carrier before scheduling to verify coverage.    PREVENTATIVE SERVICES FREQUENCY &  COVERAGE DETAILS LAST COMPLETION DATE Pneumococcal Each vaccine (Teeoafd82 & Sbwzkfhdv60) covered once after 65 Prevnar 13: 07/23/2015    Muozgwomd76: 09/14/2012     No recommendations at this time    Hepatitis B One screening covered for patients with certain risk factors   -  No recommendati

## 2021-09-28 NOTE — PATIENT INSTRUCTIONS
[facility] Al[facility]'s SCREENING SCHEDULE   Tests on this list are recommended by your physician but may not be covered, or covered at this frequency, by your insurer. Please check with your insurance carrier before scheduling to verify coverage.    PREVENT Pneumococcal Each vaccine (Ehlbkhx61 & Mkxhaybpa89) covered once after 65 Prevnar 13: 07/23/2015    Gynaockqd36: 09/14/2012     No recommendations at this time    Hepatitis B One screening covered for patients with certain risk factors   -  No recommendati

## 2021-10-07 ENCOUNTER — PATIENT MESSAGE (OUTPATIENT)
Dept: INTERNAL MEDICINE CLINIC | Facility: CLINIC | Age: 76
End: 2021-10-07

## 2021-10-08 ENCOUNTER — TELEPHONE (OUTPATIENT)
Dept: INTERNAL MEDICINE CLINIC | Facility: CLINIC | Age: 76
End: 2021-10-08

## 2021-10-08 NOTE — TELEPHONE ENCOUNTER
From: Nimisha Kern  To:  KENJI Gu  Sent: 10/7/2021 9:46 PM CDT  Subject: Test for blood in stool (FIT/FOBT) Overdue since September 28, 2021    i received results :

## 2021-10-22 ENCOUNTER — LAB ENCOUNTER (OUTPATIENT)
Dept: LAB | Age: 76
End: 2021-10-22
Attending: NURSE PRACTITIONER
Payer: MEDICARE

## 2021-10-22 DIAGNOSIS — D58.2 ELEVATED HEMOGLOBIN (HCC): ICD-10-CM

## 2021-10-22 PROCEDURE — 85025 COMPLETE CBC W/AUTO DIFF WBC: CPT

## 2021-10-22 PROCEDURE — 36415 COLL VENOUS BLD VENIPUNCTURE: CPT

## 2021-10-24 DIAGNOSIS — E66.01 SEVERE OBESITY (BMI 35.0-39.9) WITH COMORBIDITY (HCC): ICD-10-CM

## 2021-10-24 DIAGNOSIS — I10 ESSENTIAL HYPERTENSION: ICD-10-CM

## 2021-10-24 DIAGNOSIS — D58.2 ELEVATED HEMOGLOBIN (HCC): Primary | ICD-10-CM

## 2021-11-22 RX ORDER — ATORVASTATIN CALCIUM 10 MG/1
TABLET, FILM COATED ORAL
Qty: 90 TABLET | Refills: 0 | Status: SHIPPED | OUTPATIENT
Start: 2021-11-22 | End: 2022-01-27

## 2021-11-22 RX ORDER — LOSARTAN POTASSIUM 50 MG/1
TABLET ORAL
Qty: 90 TABLET | Refills: 0 | Status: SHIPPED | OUTPATIENT
Start: 2021-11-22 | End: 2022-02-04

## 2021-11-22 RX ORDER — AMLODIPINE BESYLATE 5 MG/1
TABLET ORAL
Qty: 90 TABLET | Refills: 0 | Status: SHIPPED | OUTPATIENT
Start: 2021-11-22 | End: 2022-02-04

## 2022-01-04 ENCOUNTER — OFFICE VISIT (OUTPATIENT)
Dept: SLEEP CENTER | Age: 77
End: 2022-01-04
Attending: NURSE PRACTITIONER
Payer: MEDICARE

## 2022-01-04 DIAGNOSIS — D58.2 ELEVATED HEMOGLOBIN (HCC): ICD-10-CM

## 2022-01-04 PROCEDURE — 95806 SLEEP STUDY UNATT&RESP EFFT: CPT

## 2022-01-05 NOTE — PROCEDURES
[address]       Accredited by the [facility] ([facility])    PATIENT'S NAME:        Nabor Reyes  ATTENDING PHYSICIAN:   Raudel Rawls M.D.   REFERRING PHYSICIAN:   SCOTT Liang apnea/hypopnea syndrome with a predominance of events during supine sleep. RECOMMENDATIONS:  Clinical correlation required.   Therapeutic options for this patient include a trial of nasal CPAP, consideration for a dental device, evaluation for upper ai

## 2022-01-27 RX ORDER — ATORVASTATIN CALCIUM 10 MG/1
TABLET, FILM COATED ORAL
Qty: 90 TABLET | Refills: 1 | Status: SHIPPED | OUTPATIENT
Start: 2022-01-27

## 2022-02-04 RX ORDER — LOSARTAN POTASSIUM 50 MG/1
TABLET ORAL
Qty: 90 TABLET | Refills: 0 | Status: SHIPPED | OUTPATIENT
Start: 2022-02-04

## 2022-02-04 RX ORDER — AMLODIPINE BESYLATE 5 MG/1
TABLET ORAL
Qty: 90 TABLET | Refills: 0 | Status: SHIPPED | OUTPATIENT
Start: 2022-02-04

## 2022-04-06 RX ORDER — ATORVASTATIN CALCIUM 10 MG/1
TABLET, FILM COATED ORAL
Qty: 90 TABLET | Refills: 1 | Status: SHIPPED | OUTPATIENT
Start: 2022-04-06

## 2022-04-20 RX ORDER — AMLODIPINE BESYLATE 5 MG/1
TABLET ORAL
Qty: 90 TABLET | Refills: 0 | OUTPATIENT
Start: 2022-04-20

## 2022-04-20 RX ORDER — LOSARTAN POTASSIUM 50 MG/1
TABLET ORAL
Qty: 90 TABLET | Refills: 0 | OUTPATIENT
Start: 2022-04-20

## 2022-04-20 NOTE — TELEPHONE ENCOUNTER
Latest RX: 90 tabs 0 refills on 2/4/22 sent for both medications    Per protocol, failed due to Appointment in past 6 or next 3 months. Rx denied too soon to fill.

## 2022-07-09 ENCOUNTER — TELEPHONE (OUTPATIENT)
Dept: INTERNAL MEDICINE CLINIC | Facility: CLINIC | Age: 77
End: 2022-07-09

## 2022-07-09 DIAGNOSIS — I10 ESSENTIAL HYPERTENSION: ICD-10-CM

## 2022-07-09 DIAGNOSIS — E78.00 PURE HYPERCHOLESTEROLEMIA: ICD-10-CM

## 2022-07-09 DIAGNOSIS — Z12.5 SCREENING FOR MALIGNANT NEOPLASM OF PROSTATE: ICD-10-CM

## 2022-07-09 DIAGNOSIS — Z00.00 ROUTINE GENERAL MEDICAL EXAMINATION AT A HEALTH CARE FACILITY: Primary | ICD-10-CM

## 2022-07-09 NOTE — TELEPHONE ENCOUNTER
Orders to Sel[facility] Pt aware to get labs done no sooner than 2 weeks prior to the appt. Pt aware to fast.  No call back required.   Future Appointments   Date Time Provider Rain Sotelo   9/15/2022  1:40 PM KENJI Soares [facility]

## 2022-09-06 ENCOUNTER — LAB ENCOUNTER (OUTPATIENT)
Dept: LAB | Age: 77
End: 2022-09-06
Attending: NURSE PRACTITIONER
Payer: MEDICARE

## 2022-09-06 DIAGNOSIS — I10 ESSENTIAL HYPERTENSION: ICD-10-CM

## 2022-09-06 DIAGNOSIS — Z12.5 SCREENING FOR MALIGNANT NEOPLASM OF PROSTATE: ICD-10-CM

## 2022-09-06 DIAGNOSIS — E78.00 PURE HYPERCHOLESTEROLEMIA: ICD-10-CM

## 2022-09-06 DIAGNOSIS — Z00.00 ROUTINE GENERAL MEDICAL EXAMINATION AT A HEALTH CARE FACILITY: ICD-10-CM

## 2022-09-06 LAB
ALBUMIN SERPL-MCNC: 3.8 G/DL (ref 3.4–5)
ALBUMIN/GLOB SERPL: 1.3 {RATIO} (ref 1–2)
ALP LIVER SERPL-CCNC: 72 U/L
ALT SERPL-CCNC: 26 U/L
ANION GAP SERPL CALC-SCNC: 3 MMOL/L (ref 0–18)
AST SERPL-CCNC: 21 U/L (ref 15–37)
BASOPHILS # BLD AUTO: 0.04 X10(3) UL (ref 0–0.2)
BASOPHILS NFR BLD AUTO: 0.6 %
BILIRUB SERPL-MCNC: 0.6 MG/DL (ref 0.1–2)
BUN BLD-MCNC: 21 MG/DL (ref 7–18)
CALCIUM BLD-MCNC: 9.4 MG/DL (ref 8.5–10.1)
CHLORIDE SERPL-SCNC: 109 MMOL/L (ref 98–112)
CHOLEST SERPL-MCNC: 170 MG/DL (ref ?–200)
CO2 SERPL-SCNC: 27 MMOL/L (ref 21–32)
COMPLEXED PSA SERPL-MCNC: 1.67 NG/ML (ref ?–4)
CREAT BLD-MCNC: 1.54 MG/DL
EOSINOPHIL # BLD AUTO: 0.17 X10(3) UL (ref 0–0.7)
EOSINOPHIL NFR BLD AUTO: 2.4 %
ERYTHROCYTE [DISTWIDTH] IN BLOOD BY AUTOMATED COUNT: 12.4 %
FASTING PATIENT LIPID ANSWER: YES
FASTING STATUS PATIENT QL REPORTED: YES
GFR SERPLBLD BASED ON 1.73 SQ M-ARVRAT: 47 ML/MIN/1.73M2 (ref 60–?)
GLOBULIN PLAS-MCNC: 2.9 G/DL (ref 2.8–4.4)
GLUCOSE BLD-MCNC: 97 MG/DL (ref 70–99)
HCT VFR BLD AUTO: 47.4 %
HDLC SERPL-MCNC: 35 MG/DL (ref 40–59)
HGB BLD-MCNC: 15.8 G/DL
IMM GRANULOCYTES # BLD AUTO: 0.01 X10(3) UL (ref 0–1)
IMM GRANULOCYTES NFR BLD: 0.1 %
LDLC SERPL CALC-MCNC: 98 MG/DL (ref ?–100)
LYMPHOCYTES # BLD AUTO: 1.32 X10(3) UL (ref 1–4)
LYMPHOCYTES NFR BLD AUTO: 18.4 %
MCH RBC QN AUTO: 31.2 PG (ref 26–34)
MCHC RBC AUTO-ENTMCNC: 33.3 G/DL (ref 31–37)
MCV RBC AUTO: 93.5 FL
MONOCYTES # BLD AUTO: 0.59 X10(3) UL (ref 0.1–1)
MONOCYTES NFR BLD AUTO: 8.2 %
NEUTROPHILS # BLD AUTO: 5.03 X10 (3) UL (ref 1.5–7.7)
NEUTROPHILS # BLD AUTO: 5.03 X10(3) UL (ref 1.5–7.7)
NEUTROPHILS NFR BLD AUTO: 70.3 %
NONHDLC SERPL-MCNC: 135 MG/DL (ref ?–130)
OSMOLALITY SERPL CALC.SUM OF ELEC: 291 MOSM/KG (ref 275–295)
PLATELET # BLD AUTO: 207 10(3)UL (ref 150–450)
POTASSIUM SERPL-SCNC: 5.1 MMOL/L (ref 3.5–5.1)
PROT SERPL-MCNC: 6.7 G/DL (ref 6.4–8.2)
RBC # BLD AUTO: 5.07 X10(6)UL
SODIUM SERPL-SCNC: 139 MMOL/L (ref 136–145)
TRIGL SERPL-MCNC: 215 MG/DL (ref 30–149)
TSI SER-ACNC: 1.63 MIU/ML (ref 0.36–3.74)
VLDLC SERPL CALC-MCNC: 36 MG/DL (ref 0–30)
WBC # BLD AUTO: 7.2 X10(3) UL (ref 4–11)

## 2022-09-06 PROCEDURE — 85025 COMPLETE CBC W/AUTO DIFF WBC: CPT

## 2022-09-06 PROCEDURE — 84443 ASSAY THYROID STIM HORMONE: CPT

## 2022-09-06 PROCEDURE — 80061 LIPID PANEL: CPT

## 2022-09-06 PROCEDURE — 80053 COMPREHEN METABOLIC PANEL: CPT

## 2022-09-06 PROCEDURE — 36415 COLL VENOUS BLD VENIPUNCTURE: CPT

## 2022-09-15 ENCOUNTER — OFFICE VISIT (OUTPATIENT)
Dept: INTERNAL MEDICINE CLINIC | Facility: CLINIC | Age: 77
End: 2022-09-15
Payer: MEDICARE

## 2022-09-15 VITALS
WEIGHT: 229.81 LBS | HEART RATE: 67 BPM | DIASTOLIC BLOOD PRESSURE: 72 MMHG | SYSTOLIC BLOOD PRESSURE: 116 MMHG | BODY MASS INDEX: 35.65 KG/M2 | TEMPERATURE: 97 F | HEIGHT: 67.5 IN | RESPIRATION RATE: 18 BRPM

## 2022-09-15 DIAGNOSIS — M17.0 BILATERAL PRIMARY OSTEOARTHRITIS OF KNEE: ICD-10-CM

## 2022-09-15 DIAGNOSIS — N28.9 RENAL PARENCHYMAL DISEASE: ICD-10-CM

## 2022-09-15 DIAGNOSIS — N28.1 RENAL CYST: ICD-10-CM

## 2022-09-15 DIAGNOSIS — N40.1 BENIGN PROSTATIC HYPERPLASIA WITH URINARY FREQUENCY: ICD-10-CM

## 2022-09-15 DIAGNOSIS — H61.23 BILATERAL IMPACTED CERUMEN: ICD-10-CM

## 2022-09-15 DIAGNOSIS — I77.819 ECTASIS AORTA (HCC): ICD-10-CM

## 2022-09-15 DIAGNOSIS — G47.33 OSA (OBSTRUCTIVE SLEEP APNEA): ICD-10-CM

## 2022-09-15 DIAGNOSIS — N18.31 STAGE 3A CHRONIC KIDNEY DISEASE (HCC): ICD-10-CM

## 2022-09-15 DIAGNOSIS — Z00.00 ENCOUNTER FOR ANNUAL HEALTH EXAMINATION: Primary | ICD-10-CM

## 2022-09-15 DIAGNOSIS — R35.0 BENIGN PROSTATIC HYPERPLASIA WITH URINARY FREQUENCY: ICD-10-CM

## 2022-09-15 DIAGNOSIS — R31.29 MICROHEMATURIA: ICD-10-CM

## 2022-09-15 DIAGNOSIS — N52.9 IMPOTENCE: ICD-10-CM

## 2022-09-15 DIAGNOSIS — E78.00 PURE HYPERCHOLESTEROLEMIA: ICD-10-CM

## 2022-09-15 DIAGNOSIS — I10 ESSENTIAL HYPERTENSION: ICD-10-CM

## 2022-09-15 DIAGNOSIS — H90.0 CONDUCTIVE HEARING LOSS, BILATERAL: ICD-10-CM

## 2022-09-15 DIAGNOSIS — E66.01 SEVERE OBESITY (BMI 35.0-39.9) WITH COMORBIDITY (HCC): Chronic | ICD-10-CM

## 2022-09-15 DIAGNOSIS — J30.1 SEASONAL ALLERGIC RHINITIS DUE TO POLLEN: ICD-10-CM

## 2022-09-15 PROCEDURE — G0439 PPPS, SUBSEQ VISIT: HCPCS | Performed by: NURSE PRACTITIONER

## 2022-09-15 PROCEDURE — 3074F SYST BP LT 130 MM HG: CPT | Performed by: NURSE PRACTITIONER

## 2022-09-15 PROCEDURE — 99397 PER PM REEVAL EST PAT 65+ YR: CPT | Performed by: NURSE PRACTITIONER

## 2022-09-15 PROCEDURE — 3008F BODY MASS INDEX DOCD: CPT | Performed by: NURSE PRACTITIONER

## 2022-09-15 PROCEDURE — 96160 PT-FOCUSED HLTH RISK ASSMT: CPT | Performed by: NURSE PRACTITIONER

## 2022-09-15 PROCEDURE — 1125F AMNT PAIN NOTED PAIN PRSNT: CPT | Performed by: NURSE PRACTITIONER

## 2022-09-15 PROCEDURE — 3078F DIAST BP <80 MM HG: CPT | Performed by: NURSE PRACTITIONER

## 2022-09-19 RX ORDER — LOSARTAN POTASSIUM 50 MG/1
50 TABLET ORAL DAILY
Qty: 90 TABLET | Refills: 1 | Status: SHIPPED | OUTPATIENT
Start: 2022-09-19

## 2022-09-19 RX ORDER — AMLODIPINE BESYLATE 5 MG/1
5 TABLET ORAL DAILY
Qty: 90 TABLET | Refills: 1 | Status: SHIPPED | OUTPATIENT
Start: 2022-09-19

## 2022-09-19 RX ORDER — ATORVASTATIN CALCIUM 10 MG/1
10 TABLET, FILM COATED ORAL NIGHTLY
Qty: 90 TABLET | Refills: 1 | Status: SHIPPED | OUTPATIENT
Start: 2022-09-19

## 2022-10-06 ENCOUNTER — HOSPITAL ENCOUNTER (OUTPATIENT)
Dept: ULTRASOUND IMAGING | Age: 77
Discharge: HOME | End: 2022-10-06
Attending: NURSE PRACTITIONER
Payer: MEDICARE

## 2022-10-06 DIAGNOSIS — R31.29 MICROHEMATURIA: ICD-10-CM

## 2022-10-06 DIAGNOSIS — N28.9 RENAL PARENCHYMAL DISEASE: ICD-10-CM

## 2022-10-06 DIAGNOSIS — N28.1 RENAL CYST: ICD-10-CM

## 2022-10-06 DIAGNOSIS — N18.31 STAGE 3A CHRONIC KIDNEY DISEASE (HCC): ICD-10-CM

## 2022-10-06 PROCEDURE — 76770 US EXAM ABDO BACK WALL COMP: CPT | Performed by: NURSE PRACTITIONER

## 2022-10-07 ENCOUNTER — LAB ENCOUNTER (OUTPATIENT)
Dept: LAB | Age: 77
End: 2022-10-07
Attending: NURSE PRACTITIONER
Payer: MEDICARE

## 2022-10-07 DIAGNOSIS — N18.31 STAGE 3A CHRONIC KIDNEY DISEASE (HCC): ICD-10-CM

## 2022-10-07 LAB
BILIRUB UR QL STRIP.AUTO: NEGATIVE
COLOR UR AUTO: YELLOW
GLUCOSE UR STRIP.AUTO-MCNC: NEGATIVE MG/DL
KETONES UR STRIP.AUTO-MCNC: NEGATIVE MG/DL
LEUKOCYTE ESTERASE UR QL STRIP.AUTO: NEGATIVE
NITRITE UR QL STRIP.AUTO: NEGATIVE
PH UR STRIP.AUTO: 5 [PH] (ref 5–8)
PROT UR STRIP.AUTO-MCNC: NEGATIVE MG/DL
RBC UR QL AUTO: NEGATIVE
SP GR UR STRIP.AUTO: 1.02 (ref 1–1.03)
UROBILINOGEN UR STRIP.AUTO-MCNC: <2 MG/DL

## 2022-10-07 PROCEDURE — 81001 URINALYSIS AUTO W/SCOPE: CPT

## 2022-10-10 ENCOUNTER — OFFICE VISIT (OUTPATIENT)
Dept: NEPHROLOGY | Facility: CLINIC | Age: 77
End: 2022-10-10
Payer: MEDICARE

## 2022-10-10 VITALS — DIASTOLIC BLOOD PRESSURE: 70 MMHG | BODY MASS INDEX: 36 KG/M2 | SYSTOLIC BLOOD PRESSURE: 118 MMHG | WEIGHT: 232 LBS

## 2022-10-10 DIAGNOSIS — I10 PRIMARY HYPERTENSION: ICD-10-CM

## 2022-10-10 DIAGNOSIS — N18.30 STAGE 3 CHRONIC KIDNEY DISEASE, UNSPECIFIED WHETHER STAGE 3A OR 3B CKD (HCC): Primary | ICD-10-CM

## 2022-10-26 RX ORDER — ATORVASTATIN CALCIUM 10 MG/1
TABLET, FILM COATED ORAL
Qty: 90 TABLET | Refills: 1 | OUTPATIENT
Start: 2022-10-26

## 2022-10-31 ENCOUNTER — PATIENT MESSAGE (OUTPATIENT)
Dept: INTERNAL MEDICINE CLINIC | Facility: CLINIC | Age: 77
End: 2022-10-31

## 2022-11-01 ENCOUNTER — MED REC SCAN ONLY (OUTPATIENT)
Dept: INTERNAL MEDICINE CLINIC | Facility: CLINIC | Age: 77
End: 2022-11-01

## 2022-11-02 ENCOUNTER — PATIENT MESSAGE (OUTPATIENT)
Dept: INTERNAL MEDICINE CLINIC | Facility: CLINIC | Age: 77
End: 2022-11-02

## 2022-11-07 NOTE — TELEPHONE ENCOUNTER
From: Natalia Henry  To:  KENJI Dutton  Sent: 10/31/2022 9:47 AM CDT  Subject: Colorectal screening    Results attached

## 2022-11-16 ENCOUNTER — IMMUNIZATION (OUTPATIENT)
Dept: LAB | Age: 77
End: 2022-11-16
Attending: EMERGENCY MEDICINE
Payer: MEDICARE

## 2022-11-16 DIAGNOSIS — Z23 NEED FOR VACCINATION: Primary | ICD-10-CM

## 2022-11-16 PROCEDURE — 90471 IMMUNIZATION ADMIN: CPT

## 2022-11-16 PROCEDURE — 90686 IIV4 VACC NO PRSV 0.5 ML IM: CPT

## 2022-11-21 ENCOUNTER — TELEPHONE (OUTPATIENT)
Dept: INTERNAL MEDICINE CLINIC | Facility: CLINIC | Age: 77
End: 2022-11-21

## 2022-11-21 ENCOUNTER — PATIENT MESSAGE (OUTPATIENT)
Dept: INTERNAL MEDICINE CLINIC | Facility: CLINIC | Age: 77
End: 2022-11-21

## 2022-11-21 NOTE — TELEPHONE ENCOUNTER
From: Ian Fonseca  To:  KENJI Lindsey  Sent: 10/31/2022 9:47 AM CDT  Subject: Colorectal screening    Results attached

## 2023-02-15 ENCOUNTER — TELEPHONE (OUTPATIENT)
Dept: INTERNAL MEDICINE CLINIC | Facility: CLINIC | Age: 78
End: 2023-02-15

## 2023-02-15 RX ORDER — LOSARTAN POTASSIUM 50 MG/1
50 TABLET ORAL DAILY
Qty: 90 TABLET | Refills: 1 | Status: SHIPPED | OUTPATIENT
Start: 2023-02-15

## 2023-02-15 RX ORDER — AMLODIPINE BESYLATE 5 MG/1
5 TABLET ORAL DAILY
Qty: 90 TABLET | Refills: 1 | Status: SHIPPED | OUTPATIENT
Start: 2023-02-15

## 2023-05-08 RX ORDER — ATORVASTATIN CALCIUM 10 MG/1
TABLET, FILM COATED ORAL
Qty: 90 TABLET | Refills: 0 | Status: SHIPPED | OUTPATIENT
Start: 2023-05-08

## 2023-06-14 ENCOUNTER — TELEPHONE (OUTPATIENT)
Dept: INTERNAL MEDICINE CLINIC | Facility: CLINIC | Age: 78
End: 2023-06-14

## 2023-06-14 DIAGNOSIS — Z00.00 ENCOUNTER FOR ANNUAL HEALTH EXAMINATION: ICD-10-CM

## 2023-06-14 DIAGNOSIS — I10 ESSENTIAL HYPERTENSION: ICD-10-CM

## 2023-06-14 DIAGNOSIS — E78.00 PURE HYPERCHOLESTEROLEMIA: ICD-10-CM

## 2023-06-14 NOTE — TELEPHONE ENCOUNTER
Future Appointments   Date Time Provider Rain Ashleigh   7/18/2023  1:40 PM KENJI Faustin [facility]     Patient is scheduled for Annual Physical.  Please place orders with Natty Kunz. Patient aware to fast.  No call back required. Patient informed that labs need to be completed no sooner than 2 weeks prior to the appointment.

## 2023-07-07 ENCOUNTER — LAB ENCOUNTER (OUTPATIENT)
Dept: LAB | Age: 78
End: 2023-07-07
Attending: NURSE PRACTITIONER
Payer: MEDICARE

## 2023-07-07 DIAGNOSIS — Z00.00 ENCOUNTER FOR ANNUAL HEALTH EXAMINATION: ICD-10-CM

## 2023-07-07 DIAGNOSIS — E78.00 PURE HYPERCHOLESTEROLEMIA: ICD-10-CM

## 2023-07-07 DIAGNOSIS — I10 ESSENTIAL HYPERTENSION: ICD-10-CM

## 2023-07-07 LAB
ALBUMIN SERPL-MCNC: 3.6 G/DL (ref 3.4–5)
ALBUMIN/GLOB SERPL: 1.3 {RATIO} (ref 1–2)
ALP LIVER SERPL-CCNC: 76 U/L
ALT SERPL-CCNC: 29 U/L
ANION GAP SERPL CALC-SCNC: 4 MMOL/L (ref 0–18)
AST SERPL-CCNC: 26 U/L (ref 15–37)
BASOPHILS # BLD AUTO: 0.03 X10(3) UL (ref 0–0.2)
BASOPHILS NFR BLD AUTO: 0.5 %
BILIRUB SERPL-MCNC: 0.8 MG/DL (ref 0.1–2)
BUN BLD-MCNC: 22 MG/DL (ref 7–18)
CALCIUM BLD-MCNC: 9.1 MG/DL (ref 8.5–10.1)
CHLORIDE SERPL-SCNC: 111 MMOL/L (ref 98–112)
CHOLEST SERPL-MCNC: 153 MG/DL (ref ?–200)
CO2 SERPL-SCNC: 24 MMOL/L (ref 21–32)
COMPLEXED PSA SERPL-MCNC: 1.67 NG/ML (ref ?–4)
CREAT BLD-MCNC: 1.44 MG/DL
EOSINOPHIL # BLD AUTO: 0.17 X10(3) UL (ref 0–0.7)
EOSINOPHIL NFR BLD AUTO: 2.9 %
ERYTHROCYTE [DISTWIDTH] IN BLOOD BY AUTOMATED COUNT: 12.6 %
FASTING PATIENT LIPID ANSWER: YES
FASTING STATUS PATIENT QL REPORTED: YES
GFR SERPLBLD BASED ON 1.73 SQ M-ARVRAT: 50 ML/MIN/1.73M2 (ref 60–?)
GLOBULIN PLAS-MCNC: 2.8 G/DL (ref 2.8–4.4)
GLUCOSE BLD-MCNC: 96 MG/DL (ref 70–99)
HCT VFR BLD AUTO: 47.6 %
HDLC SERPL-MCNC: 36 MG/DL (ref 40–59)
HGB BLD-MCNC: 15.7 G/DL
IMM GRANULOCYTES # BLD AUTO: 0.02 X10(3) UL (ref 0–1)
IMM GRANULOCYTES NFR BLD: 0.3 %
LDLC SERPL CALC-MCNC: 93 MG/DL (ref ?–100)
LYMPHOCYTES # BLD AUTO: 1.03 X10(3) UL (ref 1–4)
LYMPHOCYTES NFR BLD AUTO: 17.4 %
MCH RBC QN AUTO: 30.3 PG (ref 26–34)
MCHC RBC AUTO-ENTMCNC: 33 G/DL (ref 31–37)
MCV RBC AUTO: 91.9 FL
MONOCYTES # BLD AUTO: 0.56 X10(3) UL (ref 0.1–1)
MONOCYTES NFR BLD AUTO: 9.4 %
NEUTROPHILS # BLD AUTO: 4.12 X10 (3) UL (ref 1.5–7.7)
NEUTROPHILS # BLD AUTO: 4.12 X10(3) UL (ref 1.5–7.7)
NEUTROPHILS NFR BLD AUTO: 69.5 %
NONHDLC SERPL-MCNC: 117 MG/DL (ref ?–130)
OSMOLALITY SERPL CALC.SUM OF ELEC: 291 MOSM/KG (ref 275–295)
PLATELET # BLD AUTO: 182 10(3)UL (ref 150–450)
POTASSIUM SERPL-SCNC: 4.7 MMOL/L (ref 3.5–5.1)
PROT SERPL-MCNC: 6.4 G/DL (ref 6.4–8.2)
RBC # BLD AUTO: 5.18 X10(6)UL
SODIUM SERPL-SCNC: 139 MMOL/L (ref 136–145)
TRIGL SERPL-MCNC: 135 MG/DL (ref 30–149)
TSI SER-ACNC: 1.69 MIU/ML (ref 0.36–3.74)
VLDLC SERPL CALC-MCNC: 22 MG/DL (ref 0–30)
WBC # BLD AUTO: 5.9 X10(3) UL (ref 4–11)

## 2023-07-07 PROCEDURE — 80053 COMPREHEN METABOLIC PANEL: CPT

## 2023-07-07 PROCEDURE — 85025 COMPLETE CBC W/AUTO DIFF WBC: CPT

## 2023-07-07 PROCEDURE — 80061 LIPID PANEL: CPT

## 2023-07-07 PROCEDURE — 36415 COLL VENOUS BLD VENIPUNCTURE: CPT

## 2023-07-07 PROCEDURE — 84443 ASSAY THYROID STIM HORMONE: CPT

## 2023-07-12 RX ORDER — AMLODIPINE BESYLATE 5 MG/1
5 TABLET ORAL DAILY
Qty: 90 TABLET | Refills: 0 | Status: SHIPPED | OUTPATIENT
Start: 2023-07-12

## 2023-07-12 RX ORDER — LOSARTAN POTASSIUM 50 MG/1
50 TABLET ORAL DAILY
Qty: 90 TABLET | Refills: 0 | Status: SHIPPED | OUTPATIENT
Start: 2023-07-12

## 2023-07-18 ENCOUNTER — OFFICE VISIT (OUTPATIENT)
Dept: INTERNAL MEDICINE CLINIC | Facility: CLINIC | Age: 78
End: 2023-07-18
Payer: MEDICARE

## 2023-07-18 ENCOUNTER — PATIENT MESSAGE (OUTPATIENT)
Dept: INTERNAL MEDICINE CLINIC | Facility: CLINIC | Age: 78
End: 2023-07-18

## 2023-07-18 VITALS
BODY MASS INDEX: 35.52 KG/M2 | SYSTOLIC BLOOD PRESSURE: 110 MMHG | RESPIRATION RATE: 16 BRPM | HEART RATE: 76 BPM | DIASTOLIC BLOOD PRESSURE: 78 MMHG | TEMPERATURE: 98 F | WEIGHT: 229 LBS | OXYGEN SATURATION: 99 % | HEIGHT: 67.5 IN

## 2023-07-18 DIAGNOSIS — G47.33 OSA (OBSTRUCTIVE SLEEP APNEA): ICD-10-CM

## 2023-07-18 DIAGNOSIS — R35.0 BENIGN PROSTATIC HYPERPLASIA WITH URINARY FREQUENCY: ICD-10-CM

## 2023-07-18 DIAGNOSIS — Z12.11 SCREEN FOR COLON CANCER: ICD-10-CM

## 2023-07-18 DIAGNOSIS — N28.1 RENAL CYST: ICD-10-CM

## 2023-07-18 DIAGNOSIS — M17.0 BILATERAL PRIMARY OSTEOARTHRITIS OF KNEE: ICD-10-CM

## 2023-07-18 DIAGNOSIS — N18.31 STAGE 3A CHRONIC KIDNEY DISEASE (HCC): ICD-10-CM

## 2023-07-18 DIAGNOSIS — E78.00 PURE HYPERCHOLESTEROLEMIA: ICD-10-CM

## 2023-07-18 DIAGNOSIS — N28.9 RENAL PARENCHYMAL DISEASE: ICD-10-CM

## 2023-07-18 DIAGNOSIS — N40.1 BENIGN PROSTATIC HYPERPLASIA WITH URINARY FREQUENCY: ICD-10-CM

## 2023-07-18 DIAGNOSIS — E66.01 SEVERE OBESITY (BMI 35.0-39.9) WITH COMORBIDITY (HCC): Chronic | ICD-10-CM

## 2023-07-18 DIAGNOSIS — Z00.00 ROUTINE GENERAL MEDICAL EXAMINATION AT A HEALTH CARE FACILITY: Primary | ICD-10-CM

## 2023-07-18 DIAGNOSIS — N52.9 IMPOTENCE: ICD-10-CM

## 2023-07-18 DIAGNOSIS — I10 ESSENTIAL HYPERTENSION: ICD-10-CM

## 2023-07-18 DIAGNOSIS — H61.23 BILATERAL IMPACTED CERUMEN: ICD-10-CM

## 2023-07-18 DIAGNOSIS — R31.29 MICROHEMATURIA: ICD-10-CM

## 2023-07-18 DIAGNOSIS — K20.90 ESOPHAGITIS: ICD-10-CM

## 2023-07-18 DIAGNOSIS — J30.1 SEASONAL ALLERGIC RHINITIS DUE TO POLLEN: ICD-10-CM

## 2023-07-18 DIAGNOSIS — H90.0 CONDUCTIVE HEARING LOSS, BILATERAL: ICD-10-CM

## 2023-07-18 DIAGNOSIS — I77.819 ECTASIS AORTA (HCC): ICD-10-CM

## 2023-07-18 PROCEDURE — 1170F FXNL STATUS ASSESSED: CPT | Performed by: NURSE PRACTITIONER

## 2023-07-18 PROCEDURE — 99214 OFFICE O/P EST MOD 30 MIN: CPT | Performed by: NURSE PRACTITIONER

## 2023-07-18 PROCEDURE — 3008F BODY MASS INDEX DOCD: CPT | Performed by: NURSE PRACTITIONER

## 2023-07-18 PROCEDURE — 3078F DIAST BP <80 MM HG: CPT | Performed by: NURSE PRACTITIONER

## 2023-07-18 PROCEDURE — 90677 PCV20 VACCINE IM: CPT | Performed by: NURSE PRACTITIONER

## 2023-07-18 PROCEDURE — 1126F AMNT PAIN NOTED NONE PRSNT: CPT | Performed by: NURSE PRACTITIONER

## 2023-07-18 PROCEDURE — 1159F MED LIST DOCD IN RCRD: CPT | Performed by: NURSE PRACTITIONER

## 2023-07-18 PROCEDURE — 96160 PT-FOCUSED HLTH RISK ASSMT: CPT | Performed by: NURSE PRACTITIONER

## 2023-07-18 PROCEDURE — G0439 PPPS, SUBSEQ VISIT: HCPCS | Performed by: NURSE PRACTITIONER

## 2023-07-18 PROCEDURE — G0009 ADMIN PNEUMOCOCCAL VACCINE: HCPCS | Performed by: NURSE PRACTITIONER

## 2023-07-18 PROCEDURE — 3074F SYST BP LT 130 MM HG: CPT | Performed by: NURSE PRACTITIONER

## 2023-07-18 RX ORDER — AMLODIPINE BESYLATE 5 MG/1
5 TABLET ORAL DAILY
Qty: 90 TABLET | Refills: 3 | Status: SHIPPED | OUTPATIENT
Start: 2023-07-18

## 2023-07-18 RX ORDER — LOSARTAN POTASSIUM 50 MG/1
50 TABLET ORAL DAILY
Qty: 90 TABLET | Refills: 3 | Status: SHIPPED | OUTPATIENT
Start: 2023-07-18

## 2023-07-18 RX ORDER — BIOTIN 5 MG
TABLET ORAL
COMMUNITY

## 2023-07-18 RX ORDER — ATORVASTATIN CALCIUM 10 MG/1
10 TABLET, FILM COATED ORAL NIGHTLY
Qty: 90 TABLET | Refills: 3 | Status: SHIPPED | OUTPATIENT
Start: 2023-07-18

## 2023-07-19 ENCOUNTER — LAB ENCOUNTER (OUTPATIENT)
Dept: LAB | Age: 78
End: 2023-07-19
Attending: NURSE PRACTITIONER
Payer: MEDICARE

## 2023-07-19 DIAGNOSIS — R31.29 MICROHEMATURIA: ICD-10-CM

## 2023-07-19 DIAGNOSIS — N28.9 RENAL PARENCHYMAL DISEASE: ICD-10-CM

## 2023-07-19 LAB
BILIRUB UR QL STRIP.AUTO: NEGATIVE
CLARITY UR REFRACT.AUTO: CLEAR
COLOR UR AUTO: YELLOW
GLUCOSE UR STRIP.AUTO-MCNC: NEGATIVE MG/DL
KETONES UR STRIP.AUTO-MCNC: NEGATIVE MG/DL
LEUKOCYTE ESTERASE UR QL STRIP.AUTO: NEGATIVE
NITRITE UR QL STRIP.AUTO: NEGATIVE
PH UR STRIP.AUTO: 5 [PH] (ref 5–8)
PROT UR STRIP.AUTO-MCNC: NEGATIVE MG/DL
RBC UR QL AUTO: NEGATIVE
SP GR UR STRIP.AUTO: 1.01 (ref 1–1.03)
UROBILINOGEN UR STRIP.AUTO-MCNC: <2 MG/DL

## 2023-07-19 PROCEDURE — 81001 URINALYSIS AUTO W/SCOPE: CPT

## 2023-08-02 ENCOUNTER — LAB ENCOUNTER (OUTPATIENT)
Dept: LAB | Age: 78
End: 2023-08-02
Attending: NURSE PRACTITIONER
Payer: MEDICARE

## 2023-09-14 ENCOUNTER — OFFICE VISIT (OUTPATIENT)
Facility: LOCATION | Age: 78
End: 2023-09-14
Payer: MEDICARE

## 2023-09-14 DIAGNOSIS — H61.23 CERUMEN DEBRIS ON TYMPANIC MEMBRANE OF BOTH EARS: Primary | ICD-10-CM

## 2023-09-14 DIAGNOSIS — H90.3 SENSORINEURAL HEARING LOSS (SNHL) OF BOTH EARS: ICD-10-CM

## 2023-09-14 PROCEDURE — 99203 OFFICE O/P NEW LOW 30 MIN: CPT | Performed by: OTOLARYNGOLOGY

## 2023-09-14 PROCEDURE — 1160F RVW MEDS BY RX/DR IN RCRD: CPT | Performed by: OTOLARYNGOLOGY

## 2023-09-14 PROCEDURE — 1159F MED LIST DOCD IN RCRD: CPT | Performed by: OTOLARYNGOLOGY

## 2023-09-16 ENCOUNTER — HOSPITAL ENCOUNTER (OUTPATIENT)
Dept: CT IMAGING | Age: 78
Discharge: HOME | End: 2023-09-16
Attending: INTERNAL MEDICINE

## 2023-09-16 DIAGNOSIS — Z13.9 SPECIAL SCREENING: ICD-10-CM

## 2023-09-18 PROBLEM — D71 GRANULOMATOUS DISEASE (HCC): Status: ACTIVE | Noted: 2023-09-18

## 2023-09-26 ENCOUNTER — PATIENT MESSAGE (OUTPATIENT)
Dept: INTERNAL MEDICINE CLINIC | Facility: CLINIC | Age: 78
End: 2023-09-26

## 2023-09-27 NOTE — TELEPHONE ENCOUNTER
From: Johana García  To: Masoud Marking  Sent: 9/26/2023 4:24 PM CDT  Subject: Appointment    I received this:     Jasen Bradshaw indicates your CT Calcium Score testing is a score for 219.58 with LDL to goal on a statin. She would like you to call our office to schedule an appointment to discuss these results further. Thank you,  Kaley Greene RN    Call the office? Or can I schedule appt on [facility]?

## 2023-10-18 ENCOUNTER — TELEMEDICINE (OUTPATIENT)
Dept: INTERNAL MEDICINE CLINIC | Facility: CLINIC | Age: 78
End: 2023-10-18
Payer: MEDICARE

## 2023-10-18 DIAGNOSIS — G47.33 OSA (OBSTRUCTIVE SLEEP APNEA): ICD-10-CM

## 2023-10-18 DIAGNOSIS — I25.10 CORONARY ARTERY CALCIFICATION SEEN ON CT SCAN: Primary | ICD-10-CM

## 2023-10-18 DIAGNOSIS — I10 ESSENTIAL HYPERTENSION: ICD-10-CM

## 2023-10-18 DIAGNOSIS — N18.31 STAGE 3A CHRONIC KIDNEY DISEASE (HCC): ICD-10-CM

## 2023-10-18 DIAGNOSIS — J02.9 PHARYNGITIS, UNSPECIFIED ETIOLOGY: ICD-10-CM

## 2023-10-18 DIAGNOSIS — U07.1 COVID: ICD-10-CM

## 2023-10-18 DIAGNOSIS — E78.00 PURE HYPERCHOLESTEROLEMIA: ICD-10-CM

## 2023-10-18 DIAGNOSIS — J84.10 CALCIFIED GRANULOMA OF LUNG (HCC): ICD-10-CM

## 2023-10-18 PROBLEM — J98.4 CALCIFIED GRANULOMA OF LUNG: Status: ACTIVE | Noted: 2023-10-18

## 2023-10-18 PROCEDURE — 99214 OFFICE O/P EST MOD 30 MIN: CPT | Performed by: NURSE PRACTITIONER

## 2023-10-18 PROCEDURE — 1159F MED LIST DOCD IN RCRD: CPT | Performed by: NURSE PRACTITIONER

## 2023-10-18 PROCEDURE — 1160F RVW MEDS BY RX/DR IN RCRD: CPT | Performed by: NURSE PRACTITIONER

## 2023-10-18 RX ORDER — ASPIRIN 81 MG/1
81 TABLET ORAL DAILY
COMMUNITY
Start: 2023-10-18

## 2023-10-18 RX ORDER — ATORVASTATIN CALCIUM 20 MG/1
20 TABLET, FILM COATED ORAL NIGHTLY
Qty: 90 TABLET | Refills: 3 | Status: SHIPPED | OUTPATIENT
Start: 2023-10-18

## 2023-10-18 NOTE — PROGRESS NOTES
This visit is conducted using video and audio. The patient consents to this service. The patient understands and accepts financial responsibility for any deductible, co-insurance and/or co-pays associated with this service. Time Spent: 20min      Luis Daniel Colón is a 70year old male. No chief complaint on file. HPI:   Video visit for discussion of UFHS results. Woke this am not feeling well  ov changed to virtual.     He woke with sore throat   He tested positive for covid this afternoon. Fever and chills overnight. Fatigue. Discussed paxlovid. He prefers to monitor. UFHS score  218 with 192 LAD   on atorvastatin 10mg with LDL 93. No previous [facility]HS located in [place]. Non smoker. Discussed score. He is quite active. He mows his lawn and his daughters lawn throughotu the summer  no chest pain or SOB. He overall is feeling fine. Discussed risk factors and risk reduction. Will increase statin to 20mg and add ASA 81mg. Stress ECHO. Untreated sleep apnea. Sleep study 2022 c/w STEPHANY   intolerant to CPAP. He did not follow up with pulmonary. Discussed with above. He agrees to see pulmonary for consultaiton. Over read with calcified granuloma. Discussed. Patient Active Problem List:     Pure hypercholesterolemia     Essential hypertension     Microhematuria     Impotence     Ectasis aorta (HCC)     CKD (chronic kidney disease) stage 3, GFR 30-59 ml/min (HCC)     Renal cyst     Renal parenchymal disease     Bilateral primary osteoarthritis of knee     Allergic rhinitis     Esophagitis     Benign prostatic hyperplasia with lower urinary tract symptoms     Severe obesity (BMI 35.0-39. 9) with comorbidity ([address]     Conductive hearing loss, bilateral     STEPHANY (obstructive sleep apnea)     Granulomatous disease (HCC)     Calcified granuloma of lung (HCC)    Current Outpatient Medications   Medication Sig Dispense Refill    atorvastatin 20 MG Oral Tab Take 1 tablet (20 mg total) by mouth nightly. 90 tablet 3    aspirin 81 MG Oral Tab EC Take 1 tablet (81 mg total) by mouth daily. Misc Natural Products (GLUCOSAMINE CHONDROITIN ADV) Oral Tab Take by mouth. amLODIPine 5 MG Oral Tab Take 1 tablet (5 mg total) by mouth daily. 90 tablet 3    losartan 50 MG Oral Tab Take 1 tablet (50 mg total) by mouth daily. 90 tablet 3    Multiple Vitamins-Minerals (DAILY COMBO MULTI VITAMINS) Oral Tab Take by mouth. Vit D, Vit C, Zinc                Allergies  No Active Allergies    REVIEW OF SYSTEMS:   GENERAL HEALTH: not feeling well. RESPIRATORY: denies shortness of breath with exertion, no cough  CARDIOVASCULAR: denies chest pain on exertion, no palpatations  GI: denies abdominal pain and denies heartburn, no diarrhea or constipation  MUSCULOSKELETAL:  myalgias  NEURO: denies headaches,       EXAM:   Limited exam as this is a video visit. Appropriate affect, alert and oriented x 3. No distress. No conversational dyspnea. Speaking in full sentences. ASSESSMENT AND PLAN:     Coronary artery calcification seen on ct scan  (primary encounter diagnosis)    cont and increase statin to 20mg  add ASA 81mg. Stress ECHO. Calcified granuloma of lung (hcc)  monitor. Covid  defers paxlovid. Monitor  he will call if needed. ER/UC warnings given. Pharyngitis, unspecified etiology  Essential hypertension  has been stable   Pure hypercholesterolemia   as above. Increase lipitor  goal LDL <70  Stage 3a chronic kidney disease (hcc) has seen Dr Chiquita Duffy. He is concerned for asa 81mg and his renal function. Will monitor. Mega (obstructive sleep apnea)  refer to pulmonary  Dr Debra Montemayor      No orders of the defined types were placed in this encounter. Meds & Refills for this Visit:  Requested Prescriptions     Signed Prescriptions Disp Refills    atorvastatin 20 MG Oral Tab 90 tablet 3     Sig: Take 1 tablet (20 mg total) by mouth nightly.        Imaging & Consults:  PULMONARY - INTERNAL  CARD ECHO STRESS ECHO/REST AND STRESS(CPT=93350/76825 [facility] [address])      Please note that the above visit was completed using two-way, real-time interactive audio and video communication. There are limitations of this visit as no physical exam could be performed. Every conscious effort was taken to allow for sufficient and adequate time. This billing visit was spent on reviewing labs, medications, radiology tests and decision making. Appropriate medical decision-making and tests are ordered as detailed in the plan of care below. Tiny Saab understands phone evaluation is not a substitute for face-to-face examination or emergency care. Patient advised to go to ER or call 911 for worsening symptoms or acute distress.

## 2023-12-07 RX ORDER — ATORVASTATIN CALCIUM 10 MG/1
10 TABLET, FILM COATED ORAL NIGHTLY
Qty: 90 TABLET | Refills: 1 | Status: SHIPPED | OUTPATIENT
Start: 2023-12-07

## 2023-12-07 RX ORDER — LOSARTAN POTASSIUM 50 MG/1
50 TABLET ORAL DAILY
Qty: 90 TABLET | Refills: 1 | Status: SHIPPED | OUTPATIENT
Start: 2023-12-07

## 2023-12-07 RX ORDER — AMLODIPINE BESYLATE 5 MG/1
5 TABLET ORAL DAILY
Qty: 90 TABLET | Refills: 1 | Status: SHIPPED | OUTPATIENT
Start: 2023-12-07

## 2023-12-07 NOTE — TELEPHONE ENCOUNTER
Last VISIT- 10/28/2023    Last CPE- 9/15/2022- SD, tested positive for covid  Last REFILL-07/12/2023    Last LABS-    No future appointments. Per PROTOCOL? Please Approve or Deny.

## 2023-12-19 ENCOUNTER — LAB ENCOUNTER (OUTPATIENT)
Dept: LAB | Age: 78
End: 2023-12-19
Attending: NURSE PRACTITIONER
Payer: MEDICARE

## 2023-12-19 DIAGNOSIS — N18.31 STAGE 3A CHRONIC KIDNEY DISEASE (HCC): ICD-10-CM

## 2023-12-19 DIAGNOSIS — E78.00 PURE HYPERCHOLESTEROLEMIA: ICD-10-CM

## 2023-12-19 LAB
ALBUMIN SERPL-MCNC: 3.9 G/DL (ref 3.4–5)
ALBUMIN/GLOB SERPL: 1.3 {RATIO} (ref 1–2)
ALP LIVER SERPL-CCNC: 87 U/L
ALT SERPL-CCNC: 33 U/L
ANION GAP SERPL CALC-SCNC: 7 MMOL/L (ref 0–18)
AST SERPL-CCNC: 22 U/L (ref 15–37)
BILIRUB SERPL-MCNC: 0.8 MG/DL (ref 0.1–2)
BUN BLD-MCNC: 23 MG/DL (ref 9–23)
CALCIUM BLD-MCNC: 9.7 MG/DL (ref 8.5–10.1)
CHLORIDE SERPL-SCNC: 110 MMOL/L (ref 98–112)
CHOLEST SERPL-MCNC: 152 MG/DL (ref ?–200)
CO2 SERPL-SCNC: 24 MMOL/L (ref 21–32)
CREAT BLD-MCNC: 1.54 MG/DL
EGFRCR SERPLBLD CKD-EPI 2021: 46 ML/MIN/1.73M2 (ref 60–?)
FASTING PATIENT LIPID ANSWER: YES
FASTING STATUS PATIENT QL REPORTED: YES
GLOBULIN PLAS-MCNC: 3.1 G/DL (ref 2.8–4.4)
GLUCOSE BLD-MCNC: 107 MG/DL (ref 70–99)
HDLC SERPL-MCNC: 34 MG/DL (ref 40–59)
LDLC SERPL CALC-MCNC: 87 MG/DL (ref ?–100)
NONHDLC SERPL-MCNC: 118 MG/DL (ref ?–130)
OSMOLALITY SERPL CALC.SUM OF ELEC: 296 MOSM/KG (ref 275–295)
POTASSIUM SERPL-SCNC: 4.8 MMOL/L (ref 3.5–5.1)
PROT SERPL-MCNC: 7 G/DL (ref 6.4–8.2)
SODIUM SERPL-SCNC: 141 MMOL/L (ref 136–145)
TRIGL SERPL-MCNC: 180 MG/DL (ref 30–149)
VLDLC SERPL CALC-MCNC: 29 MG/DL (ref 0–30)

## 2023-12-19 PROCEDURE — 80061 LIPID PANEL: CPT

## 2023-12-19 PROCEDURE — 36415 COLL VENOUS BLD VENIPUNCTURE: CPT

## 2023-12-19 PROCEDURE — 80053 COMPREHEN METABOLIC PANEL: CPT

## 2024-01-25 ENCOUNTER — HOSPITAL ENCOUNTER (EMERGENCY)
Facility: HOSPITAL | Age: 79
Discharge: HOME | End: 2024-01-25
Attending: EMERGENCY MEDICINE
Payer: MEDICARE

## 2024-01-25 ENCOUNTER — APPOINTMENT (OUTPATIENT)
Dept: MRI IMAGING | Facility: HOSPITAL | Age: 79
End: 2024-01-25
Attending: EMERGENCY MEDICINE
Payer: MEDICARE

## 2024-01-25 ENCOUNTER — TELEPHONE (OUTPATIENT)
Dept: INTERNAL MEDICINE CLINIC | Facility: CLINIC | Age: 79
End: 2024-01-25

## 2024-01-25 VITALS
WEIGHT: 223.94 LBS | SYSTOLIC BLOOD PRESSURE: 124 MMHG | TEMPERATURE: 98 F | HEART RATE: 63 BPM | DIASTOLIC BLOOD PRESSURE: 79 MMHG | BODY MASS INDEX: 35 KG/M2 | OXYGEN SATURATION: 96 % | RESPIRATION RATE: 16 BRPM

## 2024-01-25 DIAGNOSIS — R42 VERTIGO: Primary | ICD-10-CM

## 2024-01-25 LAB
ALBUMIN SERPL-MCNC: 3.8 G/DL (ref 3.4–5)
ALBUMIN/GLOB SERPL: 1.4 {RATIO} (ref 1–2)
ALP LIVER SERPL-CCNC: 83 U/L
ANION GAP SERPL CALC-SCNC: 3 MMOL/L (ref 0–18)
AST SERPL-CCNC: 13 U/L (ref 15–37)
ATRIAL RATE: 69 BPM
BASOPHILS # BLD AUTO: 0.02 X10(3) UL (ref 0–0.2)
BASOPHILS NFR BLD AUTO: 0.3 %
BILIRUB SERPL-MCNC: 0.7 MG/DL (ref 0.1–2)
BUN BLD-MCNC: 18 MG/DL (ref 9–23)
CALCIUM BLD-MCNC: 9.1 MG/DL (ref 8.5–10.1)
CHLORIDE SERPL-SCNC: 111 MMOL/L (ref 98–112)
CO2 SERPL-SCNC: 25 MMOL/L (ref 21–32)
CREAT BLD-MCNC: 1.36 MG/DL
EGFRCR SERPLBLD CKD-EPI 2021: 54 ML/MIN/1.73M2 (ref 60–?)
EOSINOPHIL # BLD AUTO: 0.13 X10(3) UL (ref 0–0.7)
EOSINOPHIL NFR BLD AUTO: 1.7 %
ERYTHROCYTE [DISTWIDTH] IN BLOOD BY AUTOMATED COUNT: 12.3 %
GLOBULIN PLAS-MCNC: 2.8 G/DL (ref 2.8–4.4)
GLUCOSE BLD-MCNC: 103 MG/DL (ref 70–99)
GLUCOSE BLD-MCNC: 82 MG/DL (ref 70–99)
HCT VFR BLD AUTO: 48.6 %
HGB BLD-MCNC: 16.7 G/DL
IMM GRANULOCYTES # BLD AUTO: 0.02 X10(3) UL (ref 0–1)
IMM GRANULOCYTES NFR BLD: 0.3 %
LYMPHOCYTES # BLD AUTO: 1.09 X10(3) UL (ref 1–4)
LYMPHOCYTES NFR BLD AUTO: 14.5 %
MAGNESIUM SERPL-MCNC: 2.2 MG/DL (ref 1.6–2.6)
MCH RBC QN AUTO: 30.7 PG (ref 26–34)
MCHC RBC AUTO-ENTMCNC: 34.4 G/DL (ref 31–37)
MCV RBC AUTO: 89.3 FL
MONOCYTES # BLD AUTO: 0.71 X10(3) UL (ref 0.1–1)
MONOCYTES NFR BLD AUTO: 9.4 %
NEUTROPHILS # BLD AUTO: 5.55 X10 (3) UL (ref 1.5–7.7)
NEUTROPHILS # BLD AUTO: 5.55 X10(3) UL (ref 1.5–7.7)
NEUTROPHILS NFR BLD AUTO: 73.8 %
OSMOLALITY SERPL CALC.SUM OF ELEC: 290 MOSM/KG (ref 275–295)
P AXIS: 35 DEGREES
P-R INTERVAL: 228 MS
PLATELET # BLD AUTO: 179 10(3)UL (ref 150–450)
POTASSIUM SERPL-SCNC: 4 MMOL/L (ref 3.5–5.1)
PROT SERPL-MCNC: 6.6 G/DL (ref 6.4–8.2)
Q-T INTERVAL: 384 MS
QRS DURATION: 82 MS
QTC CALCULATION (BEZET): 411 MS
R AXIS: 20 DEGREES
RBC # BLD AUTO: 5.44 X10(6)UL
SODIUM SERPL-SCNC: 139 MMOL/L (ref 136–145)
T AXIS: 12 DEGREES
TROPONIN I SERPL HS-MCNC: 6 NG/L
VENTRICULAR RATE: 69 BPM
WBC # BLD AUTO: 7.5 X10(3) UL (ref 4–11)

## 2024-01-25 PROCEDURE — 80053 COMPREHEN METABOLIC PANEL: CPT

## 2024-01-25 PROCEDURE — 80053 COMPREHEN METABOLIC PANEL: CPT | Performed by: EMERGENCY MEDICINE

## 2024-01-25 PROCEDURE — 99285 EMERGENCY DEPT VISIT HI MDM: CPT

## 2024-01-25 PROCEDURE — 83735 ASSAY OF MAGNESIUM: CPT | Performed by: EMERGENCY MEDICINE

## 2024-01-25 PROCEDURE — 85025 COMPLETE CBC W/AUTO DIFF WBC: CPT | Performed by: EMERGENCY MEDICINE

## 2024-01-25 PROCEDURE — 96374 THER/PROPH/DIAG INJ IV PUSH: CPT

## 2024-01-25 PROCEDURE — 70551 MRI BRAIN STEM W/O DYE: CPT | Performed by: EMERGENCY MEDICINE

## 2024-01-25 PROCEDURE — 82962 GLUCOSE BLOOD TEST: CPT

## 2024-01-25 PROCEDURE — 93010 ELECTROCARDIOGRAM REPORT: CPT

## 2024-01-25 PROCEDURE — 84484 ASSAY OF TROPONIN QUANT: CPT | Performed by: EMERGENCY MEDICINE

## 2024-01-25 PROCEDURE — 85025 COMPLETE CBC W/AUTO DIFF WBC: CPT

## 2024-01-25 PROCEDURE — 93005 ELECTROCARDIOGRAM TRACING: CPT

## 2024-01-25 RX ORDER — DIAZEPAM 5 MG/ML
2.5 INJECTION, SOLUTION INTRAMUSCULAR; INTRAVENOUS ONCE
Status: COMPLETED | OUTPATIENT
Start: 2024-01-25 | End: 2024-01-25

## 2024-01-25 RX ORDER — DIAZEPAM 2 MG/1
2 TABLET ORAL EVERY 12 HOURS PRN
Qty: 10 TABLET | Refills: 0 | Status: SHIPPED | OUTPATIENT
Start: 2024-01-25 | End: 2024-01-30

## 2024-01-25 RX ORDER — MECLIZINE HYDROCHLORIDE 25 MG/1
25 TABLET ORAL 3 TIMES DAILY PRN
Qty: 10 TABLET | Refills: 0 | Status: SHIPPED | OUTPATIENT
Start: 2024-01-25 | End: 2024-01-28

## 2024-01-25 RX ORDER — MECLIZINE HYDROCHLORIDE 25 MG/1
25 TABLET ORAL ONCE
Status: COMPLETED | OUTPATIENT
Start: 2024-01-25 | End: 2024-01-25

## 2024-01-25 NOTE — ED PROVIDER NOTES
Patient Seen in: [facility] Emergency Department      History     Chief Complaint   Patient presents with    Dizziness     Stated Complaint: yesterday felt eqilibrium was off. N/V. States felt better overnight last night*    Subjective:   HPI    78-year-old male with past medical history of hypertension and hyperlipidemia presents today for evaluation of dizziness.  Patient went to bed on Tuesday at midnight feeling asymptomatic.  Yesterday at 4:30 AM, he awoke and the room was spinning.  He walked to the bathroom however needed to hold onto the wall for support.  He went back to bed and again awoke at 8:30 AM.  The dizziness was still there.  He spent the majority of the day in a chair to not exacerbate the dizziness which is usually worse with movement.  He had nonbilious, nonbloody emesis around 3:30 in the afternoon.  This did seem to help his symptoms.  He did not have any headache, blurred vision, vomiting, focal weakness, sensory changes, chest pain, shortness of breath or any other physical symptoms.  He again awoke this morning at 4:30 AM with dizziness.  He contacted his primary care doctor's office and was advised that he come to the ER for evaluation.  Patient had a similar dizziness 1 or 2 years ago without any clear cause.  1 week ago, he had a transient right ear pain that improved on its own although denied any fevers, runny nose or cough.    Objective:   Past Medical History:   Diagnosis Date    Duodenal ulcer, unspecified as acute or chronic, without hemorrhage, perforation, or obstruction     ED (erectile dysfunction)     Esophageal hemorrhage     Microhematuria     Other and unspecified hyperlipidemia     Unspecified essential hypertension               Past Surgical History:   Procedure Laterality Date    COLONOSCOPY  2002 2009    TONSILLECTOMY  1957    UPPER GI ENDOSCOPY,EXAM  2009                Social History     Socioeconomic History    Marital status:    Tobacco Use    Smoking  status: Never    Smokeless tobacco: Never   Vaping Use    Vaping Use: Never used   Substance and Sexual Activity    Alcohol use: Yes     Alcohol/week: 1.0 standard drink of alcohol     Types: 1 Standard drinks or equivalent per week     Comment: cage 1/16/19    Drug use: No    Sexual activity: Yes     Partners: Female   Other Topics Concern    Caffeine Concern No    Exercise Yes     Comment: no regular regimen              Review of Systems    Positive for stated complaint: yesterday felt eqilibrium was off. N/V. States felt better overnight last night*  Other systems are as noted in HPI.  Constitutional and vital signs reviewed.      All other systems reviewed and negative except as noted above.    Physical Exam     ED Triage Vitals [01/25/24 1114]   BP (!) 146/92   Pulse 63   Resp 14   Temp 97.9 °F (36.6 °C)   Temp src Oral   SpO2 97 %   O2 Device None (Room air)       Current:/79   Pulse 63   Temp 97.9 °F (36.6 °C) (Oral)   Resp 16   Wt 101.6 kg   SpO2 96%   BMI 34.56 kg/m²         Physical Exam  Vitals and nursing note reviewed.   Constitutional:       Appearance: Normal appearance.   HENT:      Head: Normocephalic and atraumatic.      Nose: Nose normal.      Mouth/Throat:      Mouth: Mucous membranes are moist.   Eyes:      Extraocular Movements: Extraocular movements intact.      Pupils: Pupils are equal, round, and reactive to light.   Cardiovascular:      Rate and Rhythm: Normal rate and regular rhythm.   Pulmonary:      Effort: Pulmonary effort is normal.      Breath sounds: Normal breath sounds.   Abdominal:      Palpations: Abdomen is soft.      Tenderness: There is no abdominal tenderness.   Musculoskeletal:         General: Normal range of motion.      Cervical back: Neck supple.   Skin:     General: Skin is warm.   Neurological:      General: No focal deficit present.      Mental Status: He is alert.      Cranial Nerves: No cranial nerve deficit.      Sensory: No sensory deficit.       Motor: No weakness.      Coordination: Coordination normal.   Psychiatric:         Mood and Affect: Mood normal.           ED Course     Labs Reviewed   COMP METABOLIC PANEL (14) - Abnormal; Notable for the following components:       Result Value    Glucose 103 (*)     Creatinine 1.36 (*)     eGFR-Cr 54 (*)     AST 13 (*)     All other components within normal limits   TROPONIN I HIGH SENSITIVITY - Normal   MAGNESIUM - Normal   POCT GLUCOSE - Normal   CBC WITH DIFFERENTIAL WITH PLATELET    Narrative:     The following orders were created for panel order CBC With Differential With Platelet.  Procedure                               Abnormality         Status                     ---------                               -----------         ------                     CBC W/ DIFFERENTIAL[[id number]]                              Final result                 Please view results for these tests on the individual orders.   RAINBOW DRAW LAVENDER   RAINBOW DRAW LIGHT GREEN   RAINBOW DRAW BLUE   CBC W/ DIFFERENTIAL     EKG    Rate, intervals and axes as noted on EKG Report.  Rate: 69  Rhythm: Sinus Rhythm  Reading: Multiple PVCs, no STEMI                 MRI BRAIN (CPT=70551)    Result Date: 1/25/2024  PROCEDURE:  MRI BRAIN (CPT=70551)  COMPARISON:  None.  INDICATIONS:  yesterday felt eqilibrium was off. N/V. States felt better overnight last night. Still dizzy. PCP advised patient to come in  TECHNIQUE:  MRI of the brain was performed with multi-planar T1, T2-weighted images with FLAIR sequences and diffusion weighted images without infusion.  PATIENT STATED HISTORY: (As transcribed by Technologist)  Patient states dizziness that started yesterday and one episode of vomiting yesterday.   FINDINGS: Patient motion noted.  The ventricles and sulci are within normal limits.  There is no midline shift or mass effect.  The basal cisterns are patent.  The gray-white matter differentiation is intact.  The craniocervical junction is  unremarkable.   Trace chronic microvascular ischemic changes in the cerebral white matter.  There is no acute intracranial hemorrhage or extra-axial fluid collection identified. No significant abnormal parenchymal gradient susceptibility. There is no restricted diffusion to suggest acute ischemia/infarction.  Trace ethmoid mucosal thickening.  Trace fluid in the left mastoid air cells.  The expected major intracranial flow voids are present.             CONCLUSION:   1. No acute intracranial abnormality identified.  2. Trace chronic microvascular ischemic changes in the cerebral white matter.   LOCATION:  [id number]   Dictated by (CST): Stefan Lozano MD on 1/25/2024 at 3:41 PM     Finalized by (CST): Stefan Lozano MD on 1/25/2024 at 3:42 PM               [facility]      Differential Diagnosis  Nontoxic, well-appearing 78-year-old male presents today for evaluation of intermittent room spinning sensation and off-balance sensation that are worse with movement.  The symptoms have now been present for 36 hours.  Differential include CVA, mass or peripheral vertigo.  Patient has no ataxia, focal weakness, sensory changes or speech changes presently.  If this were CVA, he would not be candidate for thrombolytics or intervention given his delayed presentation.  I do however favor this more peripheral vertigo.  Will give meclizine along with Valium.  Plan for MRI of the brain along with labs.  Will reassess.    3:54 pm  Labs and imaging are reassuring.  Following treatment, patient was able to ambulate to the washroom and changed without difficulty.  With his clinical improvement and reassuring workup, I feel patient is stable for discharge home.  I counseled him on continued management for home, and provided him with a referral for vestibular therapy.  I advised PCP follow-up in the next week for reassessment return for new or worsening symptoms.  He feels comfortable with plan, will DC.    External Chart Reviewed  I reviewed the  telephone note through his primary care doctor's office.                                   Medical Decision Making      Disposition and Plan     Clinical Impression:  1. Vertigo         Disposition:  Discharge  1/25/2024  3:57 pm    Follow-up:  Thony Oh MD  [address] [place] [address]  [phone]    Call in 1 day(s)            Medications Prescribed:  Discharge Medication List as of 1/25/2024  4:04 PM        START taking these medications    Details   meclizine 25 MG Oral Tab Take 1 tablet (25 mg total) by mouth 3 (three) times daily as needed for Dizziness., Normal, Disp-10 tablet, R-0      diazePAM 2 MG Oral Tab Take 1 tablet (2 mg total) by mouth every 12 (twelve) hours as needed (dizziness)., Normal, Disp-10 tablet, R-0

## 2024-01-25 NOTE — TELEPHONE ENCOUNTER
Spoke to pt. Per pt, starting at 0430 yesterday morning, he was unable to walk without losing his balance. Denies weakness, but stated his \"equilibrium\" was off. When he would bend forward, his head would spin. He ate a quesadilla for lunch and was starting to feel better, but then at 1600 started to vomit. Vomited x5. He was feeling better until he got up to use the restroom early this am, and then dizziness returned. This morning lightheadedness/dizziness continues. Advised to be seen in ER or  at minimum for eval/labs/IVF/potential imaging. Warned pt if he goes to , they may send him to ER if further workup is needed that cannot be done at . Advised to have  given dizziness. Pt stated understanding and said he will work on trying to find  to go to [facility].     GEN SPANGLER

## 2024-01-25 NOTE — DISCHARGE INSTRUCTIONS
Follow-up with your primary care doctor within the next week for reassessment.  Take the medication as prescribed.  The Valium can make you drowsy, so be careful in taking it.  Return for any concerning symptoms.

## 2024-01-25 NOTE — ED INITIAL ASSESSMENT (HPI)
Pt reports yesterday at 4;30am almost lost his balance going to the BR.  Pt went back to bed and then in the morning almost lost his balance again.  Pt vomited x5 times.  Pt never fell.  Pt woke up this am more steady gait walking.  Pt drank electrolytes.  Pt feels unsteady while standing.  Pt closed his eyes and feel things spin.

## 2024-01-25 NOTE — TELEPHONE ENCOUNTER
Today he is lightheaded, unbalanced, yesterday he was dizzy, nauseous and vomited once at 4pm he continuous vomited until he was dry heaving.

## 2024-01-26 ENCOUNTER — PATIENT OUTREACH (OUTPATIENT)
Dept: CASE MANAGEMENT | Age: 79
End: 2024-01-26

## 2024-01-26 NOTE — PROGRESS NOTES
2nd attempt ER f/up apt request    Cristela santana APN  PCP  [address]  [address]  [facility] [address]  [phone]  Apt: Feb 15 @1:30pm     Confirmed w/ pt  Closing encounter

## 2024-02-01 ENCOUNTER — PATIENT OUTREACH (OUTPATIENT)
Dept: INTERNAL MEDICINE CLINIC | Facility: CLINIC | Age: 79
End: 2024-02-01

## 2024-02-01 NOTE — PROGRESS NOTES
Returned call to pt and was able to confirm that he's already scheduled the outpateint test with central scheduling.    He states that no further assistance is needed.    Closing encounter

## 2024-02-07 ENCOUNTER — TELEPHONE (OUTPATIENT)
Dept: INTERNAL MEDICINE CLINIC | Facility: CLINIC | Age: 79
End: 2024-02-07

## 2024-02-07 DIAGNOSIS — I10 ESSENTIAL HYPERTENSION: ICD-10-CM

## 2024-02-07 DIAGNOSIS — I25.10 CORONARY ARTERY CALCIFICATION SEEN ON CT SCAN: Primary | ICD-10-CM

## 2024-02-07 DIAGNOSIS — E78.5 DYSLIPIDEMIA: ICD-10-CM

## 2024-02-07 NOTE — TELEPHONE ENCOUNTER
LOV 7/18/23     Spoke with Madeleine. Madeleine stated the pt is at [place] location for card echo stress test and he is having a PVC every 4th beat and she is unable to get resting US images. Madeleine is making recommendation for pt to have nuclear stress. Stated they do not do that at [place] office and will probably have to be done at [facility].     SD - Would you order nuclear stress instead?

## 2024-02-07 NOTE — TELEPHONE ENCOUNTER
Called Madeleine from [place] lab. Notified we placed order for nuclear stress lexiscan. Madeleine stated they have an apt on hold for the pt on Tuesday so once pt calls to schedule they can get him in. Notified I will call and alert pt.       Attempted to call pt's home and mobile number number. LMTCB       Upon call back, advise pt Nuc stress ordered and there is apt on hold for him at [place] location Tuesday, 2/13 at 9:30AM. Pt will need to call and schedule

## 2024-02-13 ENCOUNTER — HOSPITAL ENCOUNTER (OUTPATIENT)
Dept: CV DIAGNOSTICS | Age: 79
Discharge: HOME | End: 2024-02-13
Attending: NURSE PRACTITIONER
Payer: MEDICARE

## 2024-02-13 PROCEDURE — 78452 HT MUSCLE IMAGE SPECT MULT: CPT | Performed by: NURSE PRACTITIONER

## 2024-02-13 PROCEDURE — 93017 CV STRESS TEST TRACING ONLY: CPT | Performed by: NURSE PRACTITIONER

## 2024-02-13 PROCEDURE — 93018 CV STRESS TEST I&R ONLY: CPT | Performed by: NURSE PRACTITIONER

## 2024-02-13 RX ORDER — REGADENOSON 0.08 MG/ML
INJECTION, SOLUTION INTRAVENOUS
Status: COMPLETED
Start: 2024-02-13 | End: 2024-02-13

## 2024-02-13 RX ADMIN — REGADENOSON 0.4 MG: 0.08 INJECTION, SOLUTION INTRAVENOUS at 10:25:00

## 2024-02-15 ENCOUNTER — OFFICE VISIT (OUTPATIENT)
Dept: INTERNAL MEDICINE CLINIC | Facility: CLINIC | Age: 79
End: 2024-02-15
Payer: MEDICARE

## 2024-02-15 VITALS
DIASTOLIC BLOOD PRESSURE: 78 MMHG | SYSTOLIC BLOOD PRESSURE: 122 MMHG | TEMPERATURE: 97 F | WEIGHT: 228.38 LBS | HEART RATE: 63 BPM | OXYGEN SATURATION: 98 % | BODY MASS INDEX: 35 KG/M2

## 2024-02-15 DIAGNOSIS — R42 VERTIGO: Primary | ICD-10-CM

## 2024-02-15 DIAGNOSIS — E78.00 PURE HYPERCHOLESTEROLEMIA: ICD-10-CM

## 2024-02-15 DIAGNOSIS — I49.8 BIGEMINY: ICD-10-CM

## 2024-02-15 DIAGNOSIS — G47.33 OSA (OBSTRUCTIVE SLEEP APNEA): ICD-10-CM

## 2024-02-15 DIAGNOSIS — N18.31 STAGE 3A CHRONIC KIDNEY DISEASE (HCC): ICD-10-CM

## 2024-02-15 DIAGNOSIS — I25.10 CORONARY ARTERY CALCIFICATION SEEN ON CT SCAN: ICD-10-CM

## 2024-02-15 DIAGNOSIS — I10 ESSENTIAL HYPERTENSION: ICD-10-CM

## 2024-02-15 PROCEDURE — 1111F DSCHRG MED/CURRENT MED MERGE: CPT | Performed by: NURSE PRACTITIONER

## 2024-02-15 PROCEDURE — 3078F DIAST BP <80 MM HG: CPT | Performed by: NURSE PRACTITIONER

## 2024-02-15 PROCEDURE — 3074F SYST BP LT 130 MM HG: CPT | Performed by: NURSE PRACTITIONER

## 2024-02-15 PROCEDURE — 99214 OFFICE O/P EST MOD 30 MIN: CPT | Performed by: NURSE PRACTITIONER

## 2024-02-15 PROCEDURE — 1159F MED LIST DOCD IN RCRD: CPT | Performed by: NURSE PRACTITIONER

## 2024-02-15 PROCEDURE — 1160F RVW MEDS BY RX/DR IN RCRD: CPT | Performed by: NURSE PRACTITIONER

## 2024-02-15 NOTE — PROGRESS NOTES
Hermes Alvarez is a 78 year old male.    Chief Complaint   Patient presents with    Hospital F/U     Vertigo        HPI:    here for ER follow up.  ER visit 1/25 for vertigo.  Meclizine and valium. Still bothersome at night.  Not given exercises to do.  Will take meclizine at hs and try exercises.  PT if not improving.     CKD 3  has seen Dr Maciel.  Overall stable       Nuclear stress test done 2/13 for  UFHS score 218 with 192 in LAD.  EF 57%  normal perfusion. On atorvastatin with LDL <100  discussed goal may be higher dose statin which we increased in October to 20mg..    he is quite active and has not had any symptoms.     Frequent PVCs and bigeminy on stress test and on ER monitor. .  His daughter is concerned about this.  In ER they commented maybe he was under stress.  K and mag stable.  Asymptomatic.   His daughter use to work with Dr Morataya who also read lexiscan.  Will refer.        Patient Active Problem List   Diagnosis    Pure hypercholesterolemia    Essential hypertension    Microhematuria    Impotence    Ectasis aorta (HCC)    CKD (chronic kidney disease) stage 3, GFR 30-59 ml/min (HCC)    Renal cyst    Renal parenchymal disease    Bilateral primary osteoarthritis of knee    Allergic rhinitis    Esophagitis    Benign prostatic hyperplasia with lower urinary tract symptoms    Severe obesity (BMI 35.0-39.9) with comorbidity (HCC)    Conductive hearing loss, bilateral    STEPHANY (obstructive sleep apnea)    Granulomatous disease (HCC)    Calcified granuloma of lung (HCC)    Coronary artery calcification seen on CT scan    Bigeminy     Current Outpatient Medications   Medication Sig Dispense Refill    amLODIPine 5 MG Oral Tab Take 1 tablet (5 mg total) by mouth daily. 90 tablet 1    losartan 50 MG Oral Tab Take 1 tablet (50 mg total) by mouth daily. 90 tablet 1    atorvastatin 20 MG Oral Tab Take 1 tablet (20 mg total) by mouth nightly. 90 tablet 3    aspirin 81 MG Oral Tab EC Take 1 tablet (81 mg  total) by mouth daily.      Misc Natural Products (GLUCOSAMINE CHONDROITIN ADV) Oral Tab Take by mouth.      Multiple Vitamins-Minerals (DAILY COMBO MULTI VITAMINS) Oral Tab Take by mouth. Vit D, Vit C, Zinc        Past Medical History:   Diagnosis Date    Duodenal ulcer, unspecified as acute or chronic, without hemorrhage, perforation, or obstruction     ED (erectile dysfunction)     Esophageal hemorrhage     Microhematuria     Other and unspecified hyperlipidemia     Unspecified essential hypertension       Social History:  Social History     Socioeconomic History    Marital status:    Tobacco Use    Smoking status: Never    Smokeless tobacco: Never   Vaping Use    Vaping Use: Never used   Substance and Sexual Activity    Alcohol use: Yes     Alcohol/week: 1.0 standard drink of alcohol     Types: 1 Standard drinks or equivalent per week     Comment: cage 1/16/19    Drug use: No    Sexual activity: Yes     Partners: Female   Other Topics Concern    Caffeine Concern No    Exercise Yes     Comment: no regular regimen     Family History   Problem Relation Age of Onset    Hypertension Father     Cancer Mother         lung and rectal    Hypertension Mother         Allergies  No Known Allergies    REVIEW OF SYSTEMS:   GENERAL HEALTH: feels better  RESPIRATORY: denies shortness of breath with exertion, no cough  CARDIOVASCULAR: denies chest pain on exertion, no palpatations  GI: denies abdominal pain and denies heartburn, no diarrhea or constipation  MUSCULOSKELETAL:  No arthralgias or myalgias  NEURO: as above     EXAM:   /78 (BP Location: Right arm, Patient Position: Sitting, Cuff Size: large)   Pulse 63   Temp 97.4 °F (36.3 °C) (Temporal)   Wt 228 lb 6.4 oz (103.6 kg)   SpO2 98%   BMI 35.24 kg/m²   GENERAL: well developed, well nourished,in no apparent distress  LUNGS: normal rate without respiratory distress, lungs clear to auscultation  CARDIO: RRR   EXTREMITIES: no edema, normal strength and  tone  PSYCH: alert and oriented x 3    ASSESSMENT AND PLAN:     Encounter Diagnoses   Name Primary?    Vertigo  improving.  Exercises given.  Meclizine hs x 5-7 nights.  Call if not continuing to improve and consider PT Yes    Coronary artery calcification seen on CT scan  lexiscan negative.  Statin increased.  He would like to see cardiology  refer to Dr Robbins.      Essential hypertension  stable     Bigeminy  symoptomatic  labs stable     Stage 3a chronic kidney disease (HCC)  Dr Maciel.      STEPHANY (obstructive sleep apnea)  not treating.  Discussed long term effects of untreated sleep apnea.      Pure hypercholesterolemia  atorvastatin in creased to 20mg          No orders of the defined types were placed in this encounter.      Meds & Refills for this Visit:  Requested Prescriptions      No prescriptions requested or ordered in this encounter       Imaging & Consults:  CARDIO - INTERNAL    No follow-ups on file.  There are no Patient Instructions on file for this visit.

## 2024-02-25 ENCOUNTER — HOSPITAL ENCOUNTER (EMERGENCY)
Age: 79
Discharge: HOME | End: 2024-02-25
Attending: EMERGENCY MEDICINE
Payer: MEDICARE

## 2024-02-25 ENCOUNTER — APPOINTMENT (OUTPATIENT)
Dept: GENERAL RADIOLOGY | Age: 79
End: 2024-02-25
Attending: EMERGENCY MEDICINE
Payer: MEDICARE

## 2024-02-25 VITALS
RESPIRATION RATE: 16 BRPM | WEIGHT: 225 LBS | DIASTOLIC BLOOD PRESSURE: 87 MMHG | TEMPERATURE: 97 F | SYSTOLIC BLOOD PRESSURE: 119 MMHG | HEIGHT: 68 IN | BODY MASS INDEX: 34.1 KG/M2 | HEART RATE: 66 BPM | OXYGEN SATURATION: 96 %

## 2024-02-25 DIAGNOSIS — M62.830 SPASM OF MUSCLE OF LOWER BACK: Primary | ICD-10-CM

## 2024-02-25 PROCEDURE — 99284 EMERGENCY DEPT VISIT MOD MDM: CPT

## 2024-02-25 PROCEDURE — 99283 EMERGENCY DEPT VISIT LOW MDM: CPT

## 2024-02-25 PROCEDURE — 96372 THER/PROPH/DIAG INJ SC/IM: CPT

## 2024-02-25 PROCEDURE — 72110 X-RAY EXAM L-2 SPINE 4/>VWS: CPT | Performed by: EMERGENCY MEDICINE

## 2024-02-25 RX ORDER — DIAZEPAM 2 MG/1
2 TABLET ORAL 3 TIMES DAILY PRN
Qty: 10 TABLET | Refills: 0 | Status: SHIPPED | OUTPATIENT
Start: 2024-02-25 | End: 2024-03-03

## 2024-02-25 RX ORDER — HYDROMORPHONE HYDROCHLORIDE 1 MG/ML
1 INJECTION, SOLUTION INTRAMUSCULAR; INTRAVENOUS; SUBCUTANEOUS ONCE
Status: COMPLETED | OUTPATIENT
Start: 2024-02-25 | End: 2024-02-25

## 2024-02-25 NOTE — ED PROVIDER NOTES
Patient Seen in: [facility] [place]      History     Chief Complaint   Patient presents with    Back Pain     Stated Complaint: lower back pain    Subjective:   HPI    This is a 78-year-old male presents emergency room for evaluation of left lower back pain.  Patient states symptoms started 4 days ago, states symptoms began after getting out of bed.  Denies any specific trauma, pain does not radiate down the extremities.  Denies saddle anesthesia.  Denies loss of bowel or bladder control.  States that he feels that the lower back will \"lock up \"with certain movements.  Denies any fevers or chills.  A review of pertinent red flag issues reveals no history of fever, IV drug use, urinary retention, history of immunosuppressive therapy, history of cancer or weight loss. No saddle anesthesia, perianal numbness, stool/bladder incontinence.    Objective:   Past Medical History:   Diagnosis Date    Duodenal ulcer, unspecified as acute or chronic, without hemorrhage, perforation, or obstruction     ED (erectile dysfunction)     Esophageal hemorrhage     Microhematuria     Other and unspecified hyperlipidemia     Unspecified essential hypertension               Past Surgical History:   Procedure Laterality Date    COLONOSCOPY  2002 2009    TONSILLECTOMY  1957    UPPER GI ENDOSCOPY,EXAM  2009                Social History     Socioeconomic History    Marital status:    Tobacco Use    Smoking status: Never    Smokeless tobacco: Never   Vaping Use    Vaping Use: Never used   Substance and Sexual Activity    Alcohol use: Yes     Alcohol/week: 1.0 standard drink of alcohol     Types: 1 Standard drinks or equivalent per week     Comment: cage 1/16/19    Drug use: No    Sexual activity: Yes     Partners: Female   Other Topics Concern    Caffeine Concern No    Exercise Yes     Comment: no regular regimen              Review of Systems    Positive for stated complaint: lower back pain  Other systems are  as noted in HPI.  Constitutional and vital signs reviewed.      All other systems reviewed and negative except as noted above.    Physical Exam     ED Triage Vitals [02/25/24 1035]   BP (!) 131/99   Pulse 83   Resp 18   Temp 97.4 °F (36.3 °C)   Temp src    SpO2 95 %   O2 Device None (Room air)       Current:/87   Pulse 66   Temp 97.4 °F (36.3 °C)   Resp 16   Ht 172.7 cm (5' 8\")   Wt 102.1 kg   SpO2 96%   BMI 34.21 kg/m²         Physical Exam    GENERAL: Patient is awake, alert, well-appearing, in no acute distress.  HEENT:  no scleral icterus.  Mucous membranes are moist  Back: No midline thoracic or lumbar spine tenderness or step-off.  There is tenderness with spasm to the musculature to the left of the lumbar spine without erythema swelling or rash.  Patient reports same location of pain she has been experiencing  HEART: Regular rate and rhythm, no murmurs.  LUNGS: Clear to auscultation bilaterally.  No Rales, no rhonchi, no wheezing, no stridor.  ABDOMEN: Soft, nondistended,non tender,  no rebound, no rigidity, no guarding.no pulsatile masses. No CVA tenderness  EXTREMITIES: No peripheral edema  NEUROLOGIC EXAM: No saddle anesthesia, muscle strength 5/5 bilateral lower extremities.  No foot drop bilaterally.  Dorsi and plantarflexion intact bilaterally.  Patient is able to squat and rise heel and toe walk  ED Course   Labs Reviewed - No data to display                   MDM        Differential diagnosis before testing includes but not limited to compression fracture, subluxation, lytic lesion/malignancy, muscular spasm, which is a medical condition that poses a threat to life/function    Radiographic images  I personally reviewed the radiographs and my individual interpretation shows no subluxations noted  I also reviewed the official reports that showed mild compression deformity of T12 indeterminate age, moderate to severe multilevel degenerative changes.      Medications Provided:  Dilaudid    Course of Events during Emergency Room Visit include x-ray of the lumbar spine performed, discussed results with the patient, patient did receive pain medication and reports he is feeling much better.  Exam is consistent with muscular spasm, discussed x-ray results, a T12 compression deformity was noted, patient reports that when he was younger he was told he had same findings which she believes was after he injured himself weightlifting.  Patient does not have any midline thoracic tenderness at this region on exam.  I discussed continue supportive care ice affected area.  Will prescribe Valium for muscle accident, alternate ibuprofen and Tylenol.  Return to ER if any change or worsening symptoms.  Follow-up with primary care.  Patient well-appearing agrees plan discharge with wife in good condition    Shared decision making was utilized           Disposition:        Discharge  I have discussed with the patient the results of test, differential diagnosis, treatment plan, warning signs and symptoms which should prompt immediate return.  They expressed understanding of these instructions and agrees to the following plan provided.  They were given written discharge instructions and agrees to return for any concerns and voiced understanding and all questions were answered.    Note to patient: The 21st Century Cures Act makes medical notes like these available to patients in the interest of transparency. However, this is a medical document intended as peer to peer communication. It is written in medical language and may contain abbreviations or verbiage that are unfamiliar. It may appear blunt or direct. Medical documents are intended to carry relevant information, facts as evident, and the clinical opinion of the practitioner.                                            Medical Decision Making      Disposition and Plan     Clinical Impression:  1. Spasm of muscle of lower back          Disposition:  Discharge  2/25/2024 12:44 pm    Follow-up:  Thony Oh MD  [address] [address]  [phone]    Follow up in 2 day(s)            Medications Prescribed:  Discharge Medication List as of 2/25/2024 12:45 PM        START taking these medications    Details   diazePAM 2 MG Oral Tab Take 1 tablet (2 mg total) by mouth 3 (three) times daily as needed (back spasm)., Normal, Disp-10 tablet, R-0

## 2024-02-25 NOTE — ED INITIAL ASSESSMENT (HPI)
Lower back pain x 4 days. No known injury. Radiates to left side.  Denies hematuria, no numbness or tingling in extremities.

## 2024-03-05 ENCOUNTER — PATIENT OUTREACH (OUTPATIENT)
Dept: CASE MANAGEMENT | Age: 79
End: 2024-03-05

## 2024-03-05 NOTE — PROGRESS NOTES
1st attempt ER f/up apt request  No answer, LVMTCB to schedule apt  PCP -unable to contact  Closing encounter

## 2024-04-12 ENCOUNTER — HOSPITAL ENCOUNTER (OUTPATIENT)
Age: 79
Discharge: HOME | End: 2024-04-12
Payer: MEDICARE

## 2024-04-12 ENCOUNTER — NURSE TRIAGE (OUTPATIENT)
Dept: INTERNAL MEDICINE CLINIC | Facility: CLINIC | Age: 79
End: 2024-04-12

## 2024-04-12 VITALS
HEIGHT: 67 IN | WEIGHT: 200 LBS | HEART RATE: 70 BPM | OXYGEN SATURATION: 98 % | SYSTOLIC BLOOD PRESSURE: 149 MMHG | TEMPERATURE: 97 F | BODY MASS INDEX: 31.39 KG/M2 | DIASTOLIC BLOOD PRESSURE: 85 MMHG | RESPIRATION RATE: 18 BRPM

## 2024-04-12 DIAGNOSIS — R00.2 PALPITATIONS: Primary | ICD-10-CM

## 2024-04-12 PROCEDURE — 93000 ELECTROCARDIOGRAM COMPLETE: CPT | Performed by: NURSE PRACTITIONER

## 2024-04-12 PROCEDURE — 99213 OFFICE O/P EST LOW 20 MIN: CPT | Performed by: NURSE PRACTITIONER

## 2024-04-12 NOTE — TELEPHONE ENCOUNTER
Patient needs additional evaluation of irregular heart beat.  If this has been reported, needs UC eval as if he is in afib will need anticoagulation.  This is difficult to assess safely over the phone.

## 2024-04-12 NOTE — TELEPHONE ENCOUNTER
Action Requested: Summary for Provider     []  Critical Lab, Recommendations Needed  [x] Need Additional Advice  []   FYI    []   Need Orders  [] Need Medications Sent to Pharmacy  []  Other     SUMMARY: Called patient to discuss slow pulse/skipped heartbeats as recorded by blood donor bank this morning.  Patient denies chest pain, dizziness, difficulty breathing, SOB, feeling skipped and rapid heartbeats.  Patient reports he feels fine.  He mowed the lawn yesterday without difficulty, had to stop once to rest sore knee.  He ran errands today without difficulty or symptoms.      Reason for call: Irregular Heart Beat  Onset: this morning    Patient has appointment with Dr Morataya/PORSHA on May 1st to review stress test.  Wondering if he should try to schedule sooner?                 Reason for Disposition   Skipped or extra beat(s) and occurs < 4 times / minute    Protocols used: Heart Rate and Heartbeat [id number]

## 2024-04-12 NOTE — ED PROVIDER NOTES
Patient Seen in: [facility]      History     Chief Complaint   Patient presents with    Arrythmia/Palpitations     Stated Complaint: low heart rate    Subjective:   78-year-old male presents to the immediate care with complaint of low heart rate.  Patient states he went to go donate blood today, and when they checked his heart rate it was 32 bpm.  A second tech came over and checked it and she said it was 47 bpm and irregular.  Patient states they would not let him donate because of the heart rate.  Typically his heart rate is between 68 and 72 bpm when he goes to donate.  He states he had a nuclear stress test done in February that did show an irregular heartbeat, but his cardiologist did not seem concerned.  He ran some errands, but called his primary care's office.  He was told that if he was concerned about it that he should come in and get it checked out.  Patient states he mowed the grass yesterday with no issues.  He denies any fatigue, lightheadedness, dizziness, chest pain, shortness of breath, arm pain or neck pain.    The history is provided by the patient.         Objective:   Past Medical History:    Duodenal ulcer, unspecified as acute or chronic, without hemorrhage, perforation, or obstruction    ED (erectile dysfunction)    Esophageal hemorrhage    Microhematuria    Other and unspecified hyperlipidemia    Unspecified essential hypertension              Past Surgical History:   Procedure Laterality Date    Colonoscopy  2002 2009    Tonsillectomy  1957    Upper gi endoscopy,exam  2009                Social History     Socioeconomic History    Marital status:    Tobacco Use    Smoking status: Never    Smokeless tobacco: Never   Vaping Use    Vaping status: Never Used   Substance and Sexual Activity    Alcohol use: Yes     Alcohol/week: 1.0 standard drink of alcohol     Types: 1 Standard drinks or equivalent per week     Comment: cage 1/16/19    Drug use: No    Sexual activity: Yes      Partners: Female   Other Topics Concern    Caffeine Concern No    Exercise Yes     Comment: no regular regimen     Social Determinants of Health      [facility]    Social Connections              Review of Systems   Constitutional: Negative.    Respiratory: Negative.  Negative for chest tightness and shortness of breath.    Cardiovascular:  Positive for palpitations. Negative for chest pain and leg swelling.   Neurological: Negative.  Negative for dizziness and light-headedness.   All other systems reviewed and are negative.      Positive for stated complaint: low heart rate  Other systems are as noted in HPI.  Constitutional and vital signs reviewed.      All other systems reviewed and negative except as noted above.    Physical Exam     ED Triage Vitals [04/12/24 1704]   /85   Pulse 70   Resp 18   Temp 97.4 °F (36.3 °C)   Temp src Temporal   SpO2 98 %   O2 Device None (Room air)       Current:/85   Pulse 70   Temp 97.4 °F (36.3 °C) (Temporal)   Resp 18   Ht 170.2 cm (5' 7\")   Wt 90.7 kg   SpO2 98%   BMI 31.32 kg/m²         Physical Exam  Vitals and nursing note reviewed.   Constitutional:       General: He is not in acute distress.     Appearance: Normal appearance. He is obese. He is not ill-appearing.   HENT:      Head: Normocephalic and atraumatic.      Mouth/Throat:      Mouth: Mucous membranes are moist.      Pharynx: Oropharynx is clear.   Eyes:      Conjunctiva/sclera: Conjunctivae normal.      Pupils: Pupils are equal, round, and reactive to light.   Cardiovascular:      Rate and Rhythm: Normal rate. Rhythm irregular.      Pulses: Normal pulses.      Heart sounds: Normal heart sounds.   Pulmonary:      Effort: Pulmonary effort is normal. No respiratory distress.      Breath sounds: Normal breath sounds.   Musculoskeletal:         General: Normal range of motion.      Right lower leg: No edema.      Left lower leg: No  edema.   Skin:     General: Skin is warm and dry.      Capillary Refill: Capillary refill takes less than 2 seconds.   Neurological:      General: No focal deficit present.      Mental Status: He is alert and oriented to person, place, and time.   Psychiatric:         Mood and Affect: Mood normal.         Behavior: Behavior normal.           ED Course   Labs Reviewed - No data to display  EKG    Rate, intervals and axes as noted on EKG Report.  Rate: 68 bpm  Rhythm: Sinus Rhythm  Reading: Sinus rhythm with first-degree AV block with frequent PVCs.  No ST elevation or STEMI.              ED Course as of 04/12/24 1732  ------------------------------------------------------------  Time: 04/12 1731  Value: EKG 12 Lead  Comment: Ventricular rate of 60 bpm.  UT interval 216 ms, QRS duration 78 ms, QT/QTc 386/410 ms.  Sinus rhythm with a first-degree AV block.  Frequent PVCs.  No STEMI or ST elevation.            [facility]                             Medical Decision Making  78-year-old male with possible low heart rate.  EKG was ordered.  EKG today shows no change from prior EKG done on January 25, 2024.  Patient's heart rate on arrival it is 70 bpm per pulse oximetry.  Patient is asymptomatic with PVCs.  Do not feel that any other labs or imaging are necessary at this time.  Patient is stable.  No evidence of MI, ACS, atrial fibrillation, second-degree block, third-degree block, or other life threat.  Patient already has a scheduled appointment with cardiology on 1 May.  He can continue to keep his scheduled appointment at that time.    Amount and/or Complexity of Data Reviewed  ECG/medicine tests: ordered and independent interpretation performed. Decision-making details documented in ED Course.        Disposition and Plan     Clinical Impression:  1. Palpitations         Disposition:  Discharge  4/12/2024  5:10 pm    Follow-up:  Thony Oh MD  [address] [place] [id number]  [phone]      As  needed          Medications Prescribed:  Discharge Medication List as of 4/12/2024  5:13 PM

## 2024-04-12 NOTE — DISCHARGE INSTRUCTIONS
Rest and drink plenty of fluids.   Continue your home medications as prescribed.   Follow up with your cardiologist as scheduled.   If anything changes or worsens go to the ER.

## 2024-04-14 LAB
ATRIAL RATE: 68 BPM
P AXIS: 35 DEGREES
P-R INTERVAL: 216 MS
Q-T INTERVAL: 386 MS
QRS DURATION: 78 MS
QTC CALCULATION (BEZET): 410 MS
R AXIS: 26 DEGREES
T AXIS: 33 DEGREES
VENTRICULAR RATE: 68 BPM

## 2024-05-02 RX ORDER — ATORVASTATIN CALCIUM 10 MG/1
10 TABLET, FILM COATED ORAL NIGHTLY
Qty: 90 TABLET | Refills: 0 | OUTPATIENT
Start: 2024-05-02

## 2024-05-02 NOTE — TELEPHONE ENCOUNTER
Requested Prescriptions     Pending Prescriptions Disp Refills    ATORVASTATIN 10 MG Oral Tab [Pharmacy Med Name: ATORVASTATIN CALCIUM 10 MG Tablet] 90 tablet 0     Sig: TAKE 1 TABLET EVERY NIGHT       LOV: 2--sd-vertigo    LAST CPE: 7--sd-physical    Last Labs: 1--cmp,cbc    Last Refill: 10mg 10--90 tabs with 3 refills----       Labs are stable  please confirm dose of atorvastatin as we discussed increase to 20mg (both 10mg and 20mg on med list)  LDL goal< 70  Has not scheduled stress test to date.  Please encourage him to do so.

## 2024-05-22 NOTE — TELEPHONE ENCOUNTER
Received faxed refill request from [facility] pharmacy              atorvastatin 20 MG Oral Tab 90 tablet 3 10/18/2023 --   Sig:   Take 1 tablet (20 mg total) by mouth nightly.     Route:   Oral     Order #:   [id number]

## 2024-05-27 RX ORDER — ATORVASTATIN CALCIUM 20 MG/1
20 TABLET, FILM COATED ORAL NIGHTLY
Qty: 90 TABLET | Refills: 0 | OUTPATIENT
Start: 2024-05-27

## 2024-05-27 RX ORDER — ATORVASTATIN CALCIUM 20 MG/1
20 TABLET, FILM COATED ORAL NIGHTLY
Qty: 90 TABLET | Refills: 3 | Status: SHIPPED | OUTPATIENT
Start: 2024-05-27

## 2024-05-27 NOTE — TELEPHONE ENCOUNTER
Please review. Protocol Failed; No Protocol    Requested Prescriptions   Pending Prescriptions Disp Refills    atorvastatin 20 MG Oral Tab 90 tablet 3     Sig: Take 1 tablet (20 mg total) by mouth nightly.       Cholesterol Medication Protocol Failed - 5/22/2024  9:30 PM        Failed - ALT < 80     Lab Results   Component Value Date    ALT  01/25/2024      Comment:      Due to  backorder we are temporarily unable to offer hospital-based ALT testing at [facility].   If urgently needed, please order ALT test code [id number].   The new order will need a new venipuncture and will be sent to [facility] for testing.   The expected turnaround time will be within 24 hours.              Passed - ALT resulted within past year        Passed - Lipid panel within past 12 months     Lab Results   Component Value Date    CHOLEST 152 12/19/2023    TRIG 180 (H) 12/19/2023    HDL 34 (L) 12/19/2023    LDL 87 12/19/2023    VLDL 29 12/19/2023    TCHDLRATIO 4.87 06/26/2018    NONHDLC 118 12/19/2023             Passed - In person appointment or virtual visit in the past 12 mos or appointment in next 3 mos     Recent Outpatient Visits              3 months ago Vertigo    [address] Patricia Maguire APRN    Office Visit    7 months ago Coronary artery calcification seen on CT scan    [address] Patricia Maguire APRN    Telemedicine    8 months ago Cerumen debris on tympanic membrane of both ears    [facility] Peter Deshpande MD    Office Visit    10 months ago Routine general medical examination at a health care facility    [address] Patricia Maguire APRN    Office Visit    1 year ago Stage 3 chronic kidney disease, unspecified whether stage 3a or 3b CKD ([facility])    [facility] Malena Maciel MD    Office Visit          Future  Appointments         Provider Department Appt Notes    In 2 weeks Patricia Hall APRN [address] nothing in particular                           Future Appointments         Provider Department Appt Notes    In 2 weeks Patricia Hall APRN [address] nothing in particular          Recent Outpatient Visits              3 months ago Vertigo    [address] Patricia Hall APRN    Office Visit    7 months ago Coronary artery calcification seen on CT scan    [address] Patricia Hall APRN    Telemedicine    8 months ago Cerumen debris on tympanic membrane of both ears    [facility] Peter Jackson MD    Office Visit    10 months ago Routine general medical examination at a health care facility    [address] Patricia Hall APRN    Office Visit    1 year ago Stage 3 chronic kidney disease, unspecified whether stage 3a or 3b CKD (HCC)    [facility] Malena Maciel MD    Office Visit

## 2024-05-28 ENCOUNTER — TELEPHONE (OUTPATIENT)
Dept: INTERNAL MEDICINE CLINIC | Facility: CLINIC | Age: 79
End: 2024-05-28

## 2024-05-28 DIAGNOSIS — Z13.228 SCREENING FOR METABOLIC DISORDER: ICD-10-CM

## 2024-05-28 DIAGNOSIS — Z13.0 SCREENING FOR DISORDER OF BLOOD AND BLOOD-FORMING ORGANS: ICD-10-CM

## 2024-05-28 DIAGNOSIS — Z13.29 SCREENING FOR THYROID DISORDER: ICD-10-CM

## 2024-05-28 DIAGNOSIS — Z13.220 SCREENING FOR LIPID DISORDERS: ICD-10-CM

## 2024-05-28 DIAGNOSIS — Z00.00 ROUTINE GENERAL MEDICAL EXAMINATION AT A HEALTH CARE FACILITY: Primary | ICD-10-CM

## 2024-05-28 NOTE — TELEPHONE ENCOUNTER
Future Appointments   Date Time Provider Department Center   6/11/2024  1:30 PM Patricia Hall APRN [facility] [address] [facility]     Informed must fast no call back required. Orders to [facility]

## 2024-06-05 ENCOUNTER — LAB ENCOUNTER (OUTPATIENT)
Dept: LAB | Age: 79
End: 2024-06-05
Attending: NURSE PRACTITIONER
Payer: MEDICARE

## 2024-06-05 DIAGNOSIS — Z13.220 SCREENING FOR LIPID DISORDERS: ICD-10-CM

## 2024-06-05 DIAGNOSIS — Z13.29 SCREENING FOR THYROID DISORDER: ICD-10-CM

## 2024-06-05 DIAGNOSIS — Z00.00 ROUTINE GENERAL MEDICAL EXAMINATION AT A HEALTH CARE FACILITY: ICD-10-CM

## 2024-06-05 DIAGNOSIS — Z13.228 SCREENING FOR METABOLIC DISORDER: ICD-10-CM

## 2024-06-05 DIAGNOSIS — Z13.0 SCREENING FOR DISORDER OF BLOOD AND BLOOD-FORMING ORGANS: ICD-10-CM

## 2024-06-05 LAB
ALBUMIN SERPL-MCNC: 3.9 G/DL (ref 3.4–5)
ALBUMIN/GLOB SERPL: 1.3 {RATIO} (ref 1–2)
ALP LIVER SERPL-CCNC: 74 U/L
ALT SERPL-CCNC: 29 U/L
ANION GAP SERPL CALC-SCNC: 10 MMOL/L (ref 0–18)
AST SERPL-CCNC: 26 U/L (ref 15–37)
BASOPHILS # BLD AUTO: 0.04 X10(3) UL (ref 0–0.2)
BASOPHILS NFR BLD AUTO: 0.7 %
BILIRUB SERPL-MCNC: 1.2 MG/DL (ref 0.1–2)
BUN BLD-MCNC: 32 MG/DL (ref 9–23)
CALCIUM BLD-MCNC: 9.3 MG/DL (ref 8.5–10.1)
CHLORIDE SERPL-SCNC: 107 MMOL/L (ref 98–112)
CHOLEST SERPL-MCNC: 157 MG/DL (ref ?–200)
CO2 SERPL-SCNC: 22 MMOL/L (ref 21–32)
CREAT BLD-MCNC: 1.63 MG/DL
EGFRCR SERPLBLD CKD-EPI 2021: 43 ML/MIN/1.73M2 (ref 60–?)
EOSINOPHIL # BLD AUTO: 0.27 X10(3) UL (ref 0–0.7)
EOSINOPHIL NFR BLD AUTO: 4.4 %
ERYTHROCYTE [DISTWIDTH] IN BLOOD BY AUTOMATED COUNT: 12 %
FASTING PATIENT LIPID ANSWER: YES
FASTING STATUS PATIENT QL REPORTED: YES
GLOBULIN PLAS-MCNC: 3 G/DL (ref 2.8–4.4)
GLUCOSE BLD-MCNC: 87 MG/DL (ref 70–99)
HCT VFR BLD AUTO: 47.6 %
HDLC SERPL-MCNC: 40 MG/DL (ref 40–59)
HGB BLD-MCNC: 16.3 G/DL
IMM GRANULOCYTES # BLD AUTO: 0.02 X10(3) UL (ref 0–1)
IMM GRANULOCYTES NFR BLD: 0.3 %
LDLC SERPL CALC-MCNC: 97 MG/DL (ref ?–100)
LYMPHOCYTES # BLD AUTO: 1.19 X10(3) UL (ref 1–4)
LYMPHOCYTES NFR BLD AUTO: 19.6 %
MCH RBC QN AUTO: 31.2 PG (ref 26–34)
MCHC RBC AUTO-ENTMCNC: 34.2 G/DL (ref 31–37)
MCV RBC AUTO: 91.2 FL
MONOCYTES # BLD AUTO: 0.55 X10(3) UL (ref 0.1–1)
MONOCYTES NFR BLD AUTO: 9.1 %
NEUTROPHILS # BLD AUTO: 4 X10 (3) UL (ref 1.5–7.7)
NEUTROPHILS # BLD AUTO: 4 X10(3) UL (ref 1.5–7.7)
NEUTROPHILS NFR BLD AUTO: 65.9 %
NONHDLC SERPL-MCNC: 117 MG/DL (ref ?–130)
OSMOLALITY SERPL CALC.SUM OF ELEC: 294 MOSM/KG (ref 275–295)
PLATELET # BLD AUTO: 200 10(3)UL (ref 150–450)
POTASSIUM SERPL-SCNC: 4.3 MMOL/L (ref 3.5–5.1)
PROT SERPL-MCNC: 6.9 G/DL (ref 6.4–8.2)
RBC # BLD AUTO: 5.22 X10(6)UL
SODIUM SERPL-SCNC: 139 MMOL/L (ref 136–145)
TRIGL SERPL-MCNC: 111 MG/DL (ref 30–149)
TSI SER-ACNC: 1.84 MIU/ML (ref 0.36–3.74)
VLDLC SERPL CALC-MCNC: 18 MG/DL (ref 0–30)
WBC # BLD AUTO: 6.1 X10(3) UL (ref 4–11)

## 2024-06-05 PROCEDURE — 36415 COLL VENOUS BLD VENIPUNCTURE: CPT

## 2024-06-05 PROCEDURE — 85025 COMPLETE CBC W/AUTO DIFF WBC: CPT

## 2024-06-05 PROCEDURE — 80053 COMPREHEN METABOLIC PANEL: CPT

## 2024-06-05 PROCEDURE — 84443 ASSAY THYROID STIM HORMONE: CPT

## 2024-06-05 PROCEDURE — 80061 LIPID PANEL: CPT

## 2024-06-11 ENCOUNTER — OFFICE VISIT (OUTPATIENT)
Dept: INTERNAL MEDICINE CLINIC | Facility: CLINIC | Age: 79
End: 2024-06-11
Payer: MEDICARE

## 2024-06-11 VITALS
DIASTOLIC BLOOD PRESSURE: 60 MMHG | BODY MASS INDEX: 35.79 KG/M2 | OXYGEN SATURATION: 96 % | HEART RATE: 69 BPM | WEIGHT: 225.38 LBS | HEIGHT: 66.54 IN | SYSTOLIC BLOOD PRESSURE: 118 MMHG | TEMPERATURE: 98 F | RESPIRATION RATE: 18 BRPM

## 2024-06-11 DIAGNOSIS — J84.10 CALCIFIED GRANULOMA OF LUNG (HCC): ICD-10-CM

## 2024-06-11 DIAGNOSIS — N18.31 STAGE 3A CHRONIC KIDNEY DISEASE (HCC): ICD-10-CM

## 2024-06-11 DIAGNOSIS — I25.10 CORONARY ARTERY CALCIFICATION SEEN ON CT SCAN: ICD-10-CM

## 2024-06-11 DIAGNOSIS — N28.1 RENAL CYST: ICD-10-CM

## 2024-06-11 DIAGNOSIS — E66.01 SEVERE OBESITY (BMI 35.0-39.9) WITH COMORBIDITY (HCC): Chronic | ICD-10-CM

## 2024-06-11 DIAGNOSIS — H90.0 CONDUCTIVE HEARING LOSS, BILATERAL: ICD-10-CM

## 2024-06-11 DIAGNOSIS — K20.90 ESOPHAGITIS: ICD-10-CM

## 2024-06-11 DIAGNOSIS — G47.33 OSA (OBSTRUCTIVE SLEEP APNEA): ICD-10-CM

## 2024-06-11 DIAGNOSIS — Z00.00 ROUTINE GENERAL MEDICAL EXAMINATION AT A HEALTH CARE FACILITY: Primary | ICD-10-CM

## 2024-06-11 DIAGNOSIS — R31.29 MICROHEMATURIA: ICD-10-CM

## 2024-06-11 DIAGNOSIS — M17.0 BILATERAL PRIMARY OSTEOARTHRITIS OF KNEE: ICD-10-CM

## 2024-06-11 DIAGNOSIS — I49.8 BIGEMINY: ICD-10-CM

## 2024-06-11 DIAGNOSIS — N28.9 RENAL PARENCHYMAL DISEASE: ICD-10-CM

## 2024-06-11 DIAGNOSIS — J30.1 SEASONAL ALLERGIC RHINITIS DUE TO POLLEN: ICD-10-CM

## 2024-06-11 DIAGNOSIS — E78.00 PURE HYPERCHOLESTEROLEMIA: ICD-10-CM

## 2024-06-11 DIAGNOSIS — N52.9 IMPOTENCE: ICD-10-CM

## 2024-06-11 DIAGNOSIS — I77.819 ECTASIS AORTA (HCC): ICD-10-CM

## 2024-06-11 DIAGNOSIS — N40.1 BENIGN PROSTATIC HYPERPLASIA WITH URINARY FREQUENCY: ICD-10-CM

## 2024-06-11 DIAGNOSIS — R35.0 BENIGN PROSTATIC HYPERPLASIA WITH URINARY FREQUENCY: ICD-10-CM

## 2024-06-11 DIAGNOSIS — I10 ESSENTIAL HYPERTENSION: ICD-10-CM

## 2024-06-11 PROBLEM — D71 GRANULOMATOUS DISEASE (HCC): Status: RESOLVED | Noted: 2023-09-18 | Resolved: 2024-06-11

## 2024-06-11 NOTE — PROGRESS NOTES
Subjective:   Hermes Alvarez is a 78 year old male who presents for a MA (Medicare Advantage) Supervisit (Once per calendar year) and Here for AWV and follow up of chronic health issues.  vertigo recently.  Stable      CKD 3  stable  Dr Maciel as needed.     HTN  stable.  Amlodipine losartan.      STEPHANY  intolerant to CPAP    Dyslipidemia   reviewed Dr Morataya note.  Goal LDL < 70  atorvastatin to 20mg    LDL 97 from 87 (just done in April). He is eating better.  More active and weight is down.   Dr Morataya s appt is Nov.   Will check lipid prior to ov and consider options.  Lipid 3 mo    Urology  Dr Morrissey  only if needed.      History/Other:   Fall Risk Assessment:   He has been screened for Falls and is low risk.      Cognitive Assessment:   He had a completely normal cognitive assessment - see flowsheet entries     Functional Ability/Status:   Hermes Alvarez has a completely normal functional assessment. See flowsheet for details.      Depression Screening (PHQ-2/PHQ-9): PHQ-2 SCORE: 0  , done 6/4/2024        Advanced Directives:   He does NOT have a Living Will. [Do you have a living will?: No]  He does NOT have a Power of  for Health Care. [Do you have a healthcare power of ?: No]  Patient has Advance Care Planning documents but we do not have a copy in EMR. Discussed Advanced Care Planning with patient and instructed patient to get our office a copy to be scanned into EMR.      Patient Active Problem List   Diagnosis    Pure hypercholesterolemia    Essential hypertension    Microhematuria    Impotence    Ectasis aorta (HCC)    CKD (chronic kidney disease) stage 3, GFR 30-59 ml/min (HCC)    Renal cyst    Renal parenchymal disease    Bilateral primary osteoarthritis of knee    Allergic rhinitis    Benign prostatic hyperplasia with lower urinary tract symptoms    Severe obesity (BMI 35.0-39.9) with comorbidity (HCC)    Conductive hearing loss, bilateral    STEPHANY (obstructive sleep apnea)     Granulomatous disease (HCC)    Calcified granuloma of lung (HCC)    Coronary artery calcification seen on CT scan    Bigeminy     Allergies:  He has No Known Allergies.    Current Medications:  Outpatient Medications Marked as Taking for the 6/11/24 encounter (Office Visit) with Patricia Hall APRN   Medication Sig    atorvastatin 20 MG Oral Tab Take 1 tablet (20 mg total) by mouth nightly.    amLODIPine 5 MG Oral Tab Take 1 tablet (5 mg total) by mouth daily.    losartan 50 MG Oral Tab Take 1 tablet (50 mg total) by mouth daily.    aspirin 81 MG Oral Tab EC Take 1 tablet (81 mg total) by mouth daily.    Multiple Vitamins-Minerals (DAILY COMBO MULTI VITAMINS) Oral Tab Take by mouth. Vit D, Vit C, Zinc       Medical History:  He  has a past medical history of Duodenal ulcer, unspecified as acute or chronic, without hemorrhage, perforation, or obstruction, ED (erectile dysfunction), Esophageal hemorrhage, Microhematuria, Other and unspecified hyperlipidemia, and Unspecified essential hypertension.  Surgical History:  He  has a past surgical history that includes colonoscopy (2002 2009); upper gi endoscopy,exam (2009); and tonsillectomy (1957).   Family History:  His family history includes Cancer in his mother; Hypertension in his father and mother.  Social History:  He  reports that he has never smoked. He has never used smokeless tobacco. He reports current alcohol use of about 1.0 standard drink of alcohol per week. He reports that he does not use drugs.    Tobacco:  He has never smoked tobacco.    CAGE Alcohol Screen:   CAGE screening score of 0 on 6/4/2024, showing low risk of alcohol abuse.      Patient Care Team:  Thony Oh MD as PCP - General  Patricia Hall APRN (Internal Medicine)  Ayden Morrissey MD (UROLOGY)  Malena Maciel MD (NEPHROLOGY)    Review of Systems     Negative except as above.     Objective:   Physical Exam  Physical Exam  General Appearance:  Alert, cooperative, no distress, appears  stated age   Head:  Normocephalic, without obvious abnormality, atraumatic   Eyes:  PERRL, conjunctiva/corneas clear, EOM's intact both eyes   Ears:  Normal TM's and external ear canals, both ears   Nose: Nares normal, septum midline,mucosa normal, no drainage or sinus tenderness   Throat: Lips, mucosa, and tongue normal; teeth and gums normal   Neck: Supple, symmetrical, trachea midline, no adenopathy;  thyroid: not enlarged, symmetric, no JVD   Back:   Symmetric, no curvature, ROM normal, no CVA tenderness   Lungs:   Clear to auscultation bilaterally, respirations unlabored   Heart:  Regular rate and rhythm, S1 and S2 normal,    Abdomen:   Soft, non-tender, bowel sounds active all four quadrants,  no masses,    Extremities: Extremities normal, atraumatic, no edema   Pulses: symmetric   Skin: Skin color, texture, turgor normal,    Lymph nodes: Cervical, supraclavicular nodes normal   Neurologic: Normal           /60 (BP Location: Left arm, Patient Position: Sitting, Cuff Size: large)   Pulse 69   Temp 97.7 °F (36.5 °C) (Temporal)   Resp 18   Ht 5' 6.54\" (1.69 m)   Wt 225 lb 6.4 oz (102.2 kg)   SpO2 96%   BMI 35.80 kg/m²  Estimated body mass index is 35.8 kg/m² as calculated from the following:    Height as of this encounter: 5' 6.54\" (1.69 m).    Weight as of this encounter: 225 lb 6.4 oz (102.2 kg).    Medicare Hearing Assessment:   Hearing Screening    Time taken: 6/11/2024  1:32 PM  Entry User: Zully Trujillo MA  Screening Method: Finger Rub  Finger Rub Result: Pass (Comment: Bilateral hearing aids)         Visual Acuity:   Right Eye Visual Acuity: Corrected Right Eye Chart Acuity: 20/20   Left Eye Visual Acuity: Corrected Left Eye Chart Acuity: 20/30   Both Eyes Visual Acuity: Corrected Both Eyes Chart Acuity: 20/20   Able To Tolerate Visual Acuity: Yes        Assessment & Plan:   Hermes Alvarez is a 78 year old male who presents for a Medicare Assessment.     1. Routine general medical  examination at a health care facility (Primary)  2. Pure hypercholesterolemia  stable  goal LDL < 70 per DR oMrataya recommendation as well.  Atrovastatin was increased in April.  LDL is higher than previous.  Will opt to watch diet and check prior to next appt with Dr Morataya this fall.  Consider rousvastatin 20mg or add zetia to atorvastain if needed to obtain goal  -     Lipid Panel; Future; Expected date: 10/11/2024  3. Calcified granuloma of lung (HCC)  stable  monitor.   4. Ectasis aorta (HCC)  stable  monitor.   5. Stage 3a chronic kidney disease (HCC)  stable  montior. Dr Maciel PRN  7. Severe obesity (BMI 35.0-39.9) with comorbidity (HCC)  stable  monitor.   8. Bigeminy  stable  w/u completed.  Monitor.  Per Dr Morataya.   9. Coronary artery calcification seen on CT scan  stable  statin. Goal LDL < 70  10. Essential hypertension stable  same meds.   11. Esophagitis  stable  monitor.  Currently without sympotms.   12. Bilateral primary osteoarthritis of knee  bothersome.  Defers ortho.  Monitor.   13. Benign prostatic hyperplasia with urinary frequency  stable  monitor  Dr Morrissey PRN  14. Impotence  stable  monitor.   15. Microhematuria  stable  montior.  Dr Morrissey if needed.   16. Renal parenchymal disease  stable  Dr Maceil if needed  17. Renal cyst  stable  montior.   18. Seasonal allergic rhinitis due to pollen  stable  monitor.   19. Conductive hearing loss, bilateral  stable  bilateral hearing aids.    20. STEPHANY (obstructive sleep apnea)  stable  intolernant to CPAP    The patient indicates understanding of these issues and agrees to the plan.  Reinforced healthy diet, lifestyle, and exercise.      No follow-ups on file.     KENJI Leal, 6/11/2024     Supplementary Documentation:   General Health:  In the past six months, have you lost more than 10 pounds without trying?: 2 - No  Has your appetite been poor?: No  Type of Diet: Balanced  How does the patient maintain a good energy level?: Other  How would  you describe your daily physical activity?: Light  How would you describe your current health state?: Good  How do you maintain positive mental well-being?: Social Interaction;Puzzles;Games;Visiting Friends;Visiting Family  On a scale of 0 to 10, with 0 being no pain and 10 being severe pain, what is your pain level?: 1 - (Mild)  In the past six months, have you experienced urine leakage?: 0-No  At any time do you feel concerned for the safety/well-being of yourself and/or your children, in your home or elsewhere?: No  Have you had any immunizations at another office such as Influenza, Hepatitis B, Tetanus, or Pneumococcal?: No        Hermes Alvarez's SCREENING SCHEDULE   Tests on this list are recommended by your physician but may not be covered, or covered at this frequency, by your insurer.   Please check with your insurance carrier before scheduling to verify coverage.   PREVENTATIVE SERVICES FREQUENCY &  COVERAGE DETAILS LAST COMPLETION DATE   Diabetes Screening    Fasting Blood Sugar / Glucose    One screening every 12 months if never tested or if previously tested but not diagnosed with pre-diabetes   One screening every 6 months if diagnosed with pre-diabetes Lab Results   Component Value Date    GLU 87 06/05/2024        Cardiovascular Disease Screening    Lipid Panel  Cholesterol  Lipoprotein (HDL)  Triglycerides Covered every 5 years for all Medicare beneficiaries without apparent signs or symptoms of cardiovascular disease Lab Results   Component Value Date    CHOLEST 157 06/05/2024    HDL 40 06/05/2024    LDL 97 06/05/2024    TRIG 111 06/05/2024         Electrocardiogram (EKG)   Covered if needed at Welcome to Medicare, and non-screening if indicated for medical reasons 04/14/2024      Ultrasound Screening for Abdominal Aortic Aneurysm (AAA) Covered once in a lifetime for one of the following risk factors    Men who are 65-75 years old and have ever smoked    Anyone with a family history -      Colorectal Cancer Screening  Covered for ages 50-85; only need ONE of the following:    Colonoscopy   Covered every 10 years    Covered every 2 years if patient is at high risk or previous colonoscopy was abnormal 08/02/2023    Health Maintenance   Topic Date Due    Colorectal Cancer Screening  08/02/2024       Flexible Sigmoidoscopy   Covered every 4 years -    Fecal Occult Blood Test Covered annually 08/02/2023   Prostate Cancer Screening    Prostate-Specific Antigen (PSA) Annually Lab Results   Component Value Date    PSA 1.98 08/01/2019     There are no preventive care reminders to display for this patient.   Immunizations    Influenza Covered once per flu season  Please get every year 10/05/2023  No recommendations at this time    Pneumococcal Each vaccine (Skallax11 & [id number]) covered once after 65 Prevnar 13: 07/23/2015    [id number]: 09/14/2012     No recommendations at this time    Hepatitis B One screening covered for patients with certain risk factors   -  No recommendations at this time    Tetanus Toxoid Not covered by Medicare Part B unless medically necessary (cut with metal); may be covered with your pharmacy prescription benefits -    Tetanus, Diptheria and Pertusis TD and TDaP Not covered by Medicare Part B -  No recommendations at this time    Zoster Not covered by Medicare Part B; may be covered with your pharmacy  prescription benefits 09/17/2014  No recommendations at this time     Annual Monitoring of Persistent Medications (ACE/ARB, digoxin diuretics, anticonvulsants)    Potassium Annually Lab Results   Component Value Date    K 4.3 06/05/2024         Creatinine   Annually Lab Results   Component Value Date    CREATSERUM 1.63 (H) 06/05/2024         BUN Annually Lab Results   Component Value Date    BUN 32 (H) 06/05/2024       Drug Serum Conc Annually No results found for: \"DIGOXIN\", \"DIG\", \"VALP\"

## 2024-07-02 NOTE — TELEPHONE ENCOUNTER
Hermes Alvarez requesting Medication Refill for:    Medication name and dose (copy and paste from medication list):   Medication Quantity Refills Start End   losartan 50 MG Oral Tab 90 tablet 1 12/7/2023 --   Sig:   Take 1 tablet (50 mg total) by mouth daily.     Route:   Oral         If medication is not on medication list - transfer patient to RN queue for triage    Preferred Pharmacy: [facility]    LOV: 6/11/2024   Last Refill date: 12/7/23  Next Scheduled appointment: No future appointments.

## 2024-07-03 NOTE — TELEPHONE ENCOUNTER
Hermes Alvarez requesting Medication Refill for:    Medication name and dose (copy and paste from medication list):   amLODIPine 5 MG Oral Tab 90 tablet 1 12/7/2023 --   Sig:   Take 1 tablet (5 mg total) by mouth daily.         If medication is not on medication list - transfer patient to RN queue for triage    Preferred Pharmacy: [facility]    LOV: 6/11/2024   Last Refill date: 12/7/23  Next Scheduled appointment: No future appointments.

## 2024-07-05 NOTE — TELEPHONE ENCOUNTER
Please review. Rx failed/no protocol.    Last office visit: 6/11/24    Requested Prescriptions   Pending Prescriptions Disp Refills    losartan 50 MG Oral Tab 90 tablet 1     Sig: Take 1 tablet (50 mg total) by mouth daily.       Hypertension Medications Protocol Failed - 7/2/2024  9:30 AM        Failed - EGFRCR or GFRNAA > 50     GFR Evaluation  EGFRCR: 43 , resulted on 6/5/2024          Passed - CMP or BMP in past 12 months        Passed - Last BP reading less than 140/90     BP Readings from Last 1 Encounters:   06/11/24 118/60               Passed - In person appointment or virtual visit in the past 12 mos or appointment in next 3 mos     Recent Outpatient Visits              3 weeks ago Routine general medical examination at a health care facility    [facility], [address] [place], [place] [address]Zarina Sandra, APRN    Office Visit    4 months ago Vertigo    [facility], [address] [place] [address] Patricia Maguire APRN    Office Visit    8 months ago Coronary artery calcification seen on CT scan    [facility], [address] [place] [address] Patricia Maguire APRN    Telemedicine    9 months ago Cerumen debris on tympanic membrane of both ears    [facility], [facility], Peter Deshpande MD    Office Visit    11 months ago Routine general medical examination at a health care facility    [facility], [address] [place], [place] [address]Zarina Sandra, APRN    Office Visit                             Recent Outpatient Visits              3 weeks ago Routine general medical examination at a health care facility    [facility], [address] [place], [place] [address]Zarina Sandra, APRN    Office Visit    4 months ago Vertigo    [address]    Office Visit    8 months ago Coronary artery calcification seen on CT scan    [address]  KENJI Gomez    Telemedicine    9 months ago Cerumen debris on tympanic membrane of both ears    [facility], [facility] Peter Deshpande MD    Office Visit    11 months ago Routine general medical examination at a health care facility    [facility], [address] [place] [address], Patricia Maguire APRN    Office Visit

## 2024-07-06 RX ORDER — LOSARTAN POTASSIUM 50 MG/1
50 TABLET ORAL DAILY
Qty: 90 TABLET | Refills: 3 | Status: SHIPPED | OUTPATIENT
Start: 2024-07-06

## 2024-07-08 RX ORDER — AMLODIPINE BESYLATE 5 MG/1
5 TABLET ORAL DAILY
Qty: 90 TABLET | Refills: 3 | Status: SHIPPED | OUTPATIENT
Start: 2024-07-08

## 2024-07-08 NOTE — TELEPHONE ENCOUNTER
Call was transferred to triage. Spoke to Mily from [facility], pt needing refill of amlodipine.     Pended.

## 2024-07-08 NOTE — TELEPHONE ENCOUNTER
Please review. Rx failed/no protocol.    Requested Prescriptions   Pending Prescriptions Disp Refills    amLODIPine 5 MG Oral Tab 90 tablet 1     Sig: Take 1 tablet (5 mg total) by mouth daily.       Hypertension Medications Protocol Failed - 7/8/2024  8:44 AM        Failed - EGFRCR or GFRNAA > 50     GFR Evaluation  EGFRCR: 43 , resulted on 6/5/2024          Passed - CMP or BMP in past 12 months        Passed - Last BP reading less than 140/90     BP Readings from Last 1 Encounters:   06/11/24 118/60               Passed - In person appointment or virtual visit in the past 12 mos or appointment in next 3 mos     Recent Outpatient Visits              3 weeks ago Routine general medical examination at a health care facility    [facility], [address] [place], [place] [address]Zarina Sandra, APRN    Office Visit    4 months ago Vertigo    [facility], [address] [place], [place] [address]Zarina Sandra, APRN    Office Visit    8 months ago Coronary artery calcification seen on CT scan    [facility], [address] [place] [address] Patricia Maguire APRN    Telemedicine    9 months ago Cerumen debris on tympanic membrane of both ears    [facility], [facility], Peter Deshpande MD    Office Visit    11 months ago Routine general medical examination at a health care facility    [facility], [address] [place] [address] Patricia Maguire APRN    Office Visit                             Recent Outpatient Visits              3 weeks ago Routine general medical examination at a health care facility    [facility], [address] [place] [address] Patricia Maguire APRN    Office Visit    4 months ago Vertigo    [facility], [address] [place] [address] Patricia Maguire APRN    Office Visit    8 months ago Coronary artery calcification seen on CT scan    [facility], [address] [place] [address] Patricia Maguire APRN    Telemedicine     9 months ago Cerumen debris on tympanic membrane of both ears    [facility], [facility] [place], Peter Deshpande MD    Office Visit    11 months ago Routine general medical examination at a health care facility    [facility], [address] [place] [address], Patricia Maguire APRN    Office Visit

## 2024-07-08 NOTE — TELEPHONE ENCOUNTER
Hermes Alvarez requesting Medication Refill for:    Medication name and dose (copy and paste from medication list):   Medication Quantity Refills Start End   amLODIPine 5 MG Oral Tab 90 tablet 1 12/7/2023 --   Sig:   Take 1 tablet (5 mg total) by mouth daily.     Route:   Oral     Order #:   [id number]         If medication is not on medication list - transfer patient to RN queue for triage    Preferred Pharmacy:   [id number] - [facility] [address] [facility] [address], [phone], [phone]   [address] [facility] [address]   Phone: [phone] Fax: [phone]   Hours: Open 24 hours       LOV: 6/11/2024   Last Refill date: 12/07/23  Next Scheduled appointment: No future appointments.

## 2024-10-06 ENCOUNTER — PATIENT MESSAGE (OUTPATIENT)
Dept: INTERNAL MEDICINE CLINIC | Facility: CLINIC | Age: 79
End: 2024-10-06

## 2024-10-07 NOTE — TELEPHONE ENCOUNTER
From: Hermes Alvarez  To: Patricia Hall  Sent: 10/6/2024 12:27 PM CDT  Subject: Blood work    How many hours do I need to fast before doing blood work?

## 2024-10-09 ENCOUNTER — LAB ENCOUNTER (OUTPATIENT)
Dept: LAB | Age: 79
End: 2024-10-09
Attending: NURSE PRACTITIONER
Payer: MEDICARE

## 2024-10-09 DIAGNOSIS — E78.00 PURE HYPERCHOLESTEROLEMIA: ICD-10-CM

## 2024-10-09 LAB
CHOLEST SERPL-MCNC: 145 MG/DL (ref ?–200)
FASTING PATIENT LIPID ANSWER: YES
HDLC SERPL-MCNC: 34 MG/DL (ref 40–59)
LDLC SERPL CALC-MCNC: 88 MG/DL (ref ?–100)
NONHDLC SERPL-MCNC: 111 MG/DL (ref ?–130)
TRIGL SERPL-MCNC: 128 MG/DL (ref 30–149)
VLDLC SERPL CALC-MCNC: 21 MG/DL (ref 0–30)

## 2024-10-09 PROCEDURE — 36415 COLL VENOUS BLD VENIPUNCTURE: CPT

## 2024-10-09 PROCEDURE — 80061 LIPID PANEL: CPT

## 2024-11-08 ENCOUNTER — PATIENT MESSAGE (OUTPATIENT)
Dept: INTERNAL MEDICINE CLINIC | Facility: CLINIC | Age: 79
End: 2024-11-08

## 2025-02-17 ENCOUNTER — OFFICE VISIT (OUTPATIENT)
Facility: LOCATION | Age: 80
End: 2025-02-17
Payer: MEDICARE

## 2025-02-17 DIAGNOSIS — H61.23 BILATERAL IMPACTED CERUMEN: Primary | ICD-10-CM

## 2025-02-17 PROCEDURE — 99212 OFFICE O/P EST SF 10 MIN: CPT | Performed by: STUDENT IN AN ORGANIZED HEALTH CARE EDUCATION/TRAINING PROGRAM

## 2025-02-17 PROCEDURE — 69210 REMOVE IMPACTED EAR WAX UNI: CPT | Performed by: STUDENT IN AN ORGANIZED HEALTH CARE EDUCATION/TRAINING PROGRAM

## 2025-02-17 PROCEDURE — 1159F MED LIST DOCD IN RCRD: CPT | Performed by: STUDENT IN AN ORGANIZED HEALTH CARE EDUCATION/TRAINING PROGRAM

## 2025-02-17 PROCEDURE — 1160F RVW MEDS BY RX/DR IN RCRD: CPT | Performed by: STUDENT IN AN ORGANIZED HEALTH CARE EDUCATION/TRAINING PROGRAM

## 2025-02-17 NOTE — PROGRESS NOTES
Hermes Alvarez is a 79 year old male.   Chief Complaint   Patient presents with    Cerumen Impaction     HPI:   79 year old male presenting for evaluation of cerumen impaction.  Patient wears hearing aids and feels like his ears are full.  He denies otalgia and otorrhea.    Current Outpatient Medications   Medication Sig Dispense Refill    tacrolimus (PROTOPIC) 0.1 % External Ointment Apply to the affected areas twice a day from Monday through Friday 30 g 3    triamcinolone 0.1 % External Cream Apply to the affected areas on the scalp twice a day; Do not apply to face or neck 45 g 2    hydrocortisone 2.5 % External Cream Apply to the affected areas on the neck twice a day 28 g 2    amLODIPine 5 MG Oral Tab Take 1 tablet (5 mg total) by mouth daily. 90 tablet 3    losartan 50 MG Oral Tab Take 1 tablet (50 mg total) by mouth daily. 90 tablet 3    atorvastatin 20 MG Oral Tab Take 1 tablet (20 mg total) by mouth nightly. 90 tablet 3    aspirin 81 MG Oral Tab EC Take 1 tablet (81 mg total) by mouth daily.      Misc Natural Products (GLUCOSAMINE CHONDROITIN ADV) Oral Tab Take by mouth. (Patient not taking: Reported on 6/11/2024)      Multiple Vitamins-Minerals (DAILY COMBO MULTI VITAMINS) Oral Tab Take by mouth. Vit D, Vit C, Zinc        Past Medical History:    Duodenal ulcer, unspecified as acute or chronic, without hemorrhage, perforation, or obstruction    ED (erectile dysfunction)    Esophageal hemorrhage    Microhematuria    Other and unspecified hyperlipidemia    Unspecified essential hypertension      Social History:  Social History     Socioeconomic History    Marital status:    Tobacco Use    Smoking status: Never    Smokeless tobacco: Never   Vaping Use    Vaping status: Never Used   Substance and Sexual Activity    Alcohol use: Yes     Alcohol/week: 1.0 standard drink of alcohol     Types: 1 Standard drinks or equivalent per week     Comment: cage 1/16/19    Drug use: No    Sexual activity: Yes      Partners: Female   Other Topics Concern    Caffeine Concern No    Exercise Yes     Comment: no regular regimen      Past Surgical History:   Procedure Laterality Date    Colonoscopy  2002 2009    Tonsillectomy  1957    Upper gi endoscopy,exam  2009         REVIEW OF SYSTEMS:   GENERAL HEALTH: feels well otherwise  GENERAL : denies fever, chills, sweats, weight loss, weight gain  SKIN: denies any unusual skin lesions or rashes  RESPIRATORY: denies shortness of breath with exertion  NEURO: denies headaches    EXAM:   There were no vitals taken for this visit.    System Findings Details   Constitutional  Overall appearance - Normal.   Head/Face  Facial features -- Normal. Skull - Normal.   Eyes  Sclera white, pupils equal and round, EOMI   Ears  External ears normal in appearance, see otomicroscopy below   Nose  External nose normal in appearance, nares patent   Oral cavity  Lips normal in appearance   Neck  Trachea midline   Neurological  Memory - Normal. Cranial nerves - Cranial nerves II through XII grossly intact.     Otomicroscopy:  A microscope was used to perform the examination.    Right: EAC impacted with cerumen, cerumen removed, TM intact without middle ear effusion  Left: EAC impacted with cerumen, cerumen removed, TM intact without middle ear effusion       ASSESSMENT AND PLAN:   79 year old male presenting for evaluation of cerumen impaction.    -Cerumen disimpacted  -Return in 12 months for repeat disimpaction    The patient indicates understanding of these issues and agrees to the plan.    Lona Self MD, SHARON  Facial Plastic & Reconstructive Surgery, Otolaryngology-Head & Neck Surgery  [facility]    This note was prepared using Dragon [employer] voice recognition dictation software. As a result, errors may occur. When identified, these errors have been corrected. While every attempt is made to correct errors during dictation, discrepancies may still exist.

## 2025-04-07 ENCOUNTER — TELEPHONE (OUTPATIENT)
Dept: INTERNAL MEDICINE CLINIC | Facility: CLINIC | Age: 80
End: 2025-04-07

## 2025-04-07 DIAGNOSIS — I10 ESSENTIAL HYPERTENSION: Primary | ICD-10-CM

## 2025-04-07 DIAGNOSIS — E78.00 PURE HYPERCHOLESTEROLEMIA: ICD-10-CM

## 2025-04-07 DIAGNOSIS — Z13.29 SCREENING FOR THYROID DISORDER: ICD-10-CM

## 2025-04-07 NOTE — TELEPHONE ENCOUNTER
Future Appointments   Date Time Provider Department Center   4/22/2025 10:00 AM Patricia Hall APRN [facility]     Orders to edward     Pt informed that labs need to be completed no sooner than 2 weeks prior to the appt. Pt aware to fast-no call back required

## 2025-04-11 ENCOUNTER — LAB ENCOUNTER (OUTPATIENT)
Dept: LAB | Age: 80
End: 2025-04-11
Attending: NURSE PRACTITIONER
Payer: MEDICARE

## 2025-04-11 DIAGNOSIS — E78.00 PURE HYPERCHOLESTEROLEMIA: ICD-10-CM

## 2025-04-11 DIAGNOSIS — I10 ESSENTIAL HYPERTENSION: ICD-10-CM

## 2025-04-11 DIAGNOSIS — Z13.29 SCREENING FOR THYROID DISORDER: ICD-10-CM

## 2025-04-11 LAB
ALBUMIN SERPL-MCNC: 4.6 G/DL (ref 3.2–4.8)
ALBUMIN/GLOB SERPL: 2.1 {RATIO} (ref 1–2)
ALP LIVER SERPL-CCNC: 83 U/L (ref 45–117)
ALT SERPL-CCNC: 20 U/L (ref 10–49)
ANION GAP SERPL CALC-SCNC: 10 MMOL/L (ref 0–18)
AST SERPL-CCNC: 26 U/L (ref ?–34)
BASOPHILS # BLD AUTO: 0.04 X10(3) UL (ref 0–0.2)
BASOPHILS NFR BLD AUTO: 0.5 %
BILIRUB SERPL-MCNC: 0.7 MG/DL (ref 0.2–1.1)
BUN BLD-MCNC: 24 MG/DL (ref 9–23)
CALCIUM BLD-MCNC: 10.3 MG/DL (ref 8.7–10.6)
CHLORIDE SERPL-SCNC: 107 MMOL/L (ref 98–112)
CHOLEST SERPL-MCNC: 144 MG/DL (ref ?–200)
CO2 SERPL-SCNC: 24 MMOL/L (ref 21–32)
CREAT BLD-MCNC: 1.47 MG/DL (ref 0.7–1.3)
EGFRCR SERPLBLD CKD-EPI 2021: 49 ML/MIN/1.73M2 (ref 60–?)
EOSINOPHIL # BLD AUTO: 0.24 X10(3) UL (ref 0–0.7)
EOSINOPHIL NFR BLD AUTO: 3 %
ERYTHROCYTE [DISTWIDTH] IN BLOOD BY AUTOMATED COUNT: 12.3 %
FASTING PATIENT LIPID ANSWER: YES
FASTING STATUS PATIENT QL REPORTED: YES
GLOBULIN PLAS-MCNC: 2.2 G/DL (ref 2–3.5)
GLUCOSE BLD-MCNC: 100 MG/DL (ref 70–99)
HCT VFR BLD AUTO: 49.4 % (ref 39–53)
HDLC SERPL-MCNC: 36 MG/DL (ref 40–59)
HGB BLD-MCNC: 16.9 G/DL (ref 13–17.5)
IMM GRANULOCYTES # BLD AUTO: 0.02 X10(3) UL (ref 0–1)
IMM GRANULOCYTES NFR BLD: 0.2 %
LDLC SERPL CALC-MCNC: 89 MG/DL (ref ?–100)
LYMPHOCYTES # BLD AUTO: 1.46 X10(3) UL (ref 1–4)
LYMPHOCYTES NFR BLD AUTO: 18 %
MCH RBC QN AUTO: 31.2 PG (ref 26–34)
MCHC RBC AUTO-ENTMCNC: 34.2 G/DL (ref 31–37)
MCV RBC AUTO: 91.3 FL (ref 80–100)
MONOCYTES # BLD AUTO: 0.77 X10(3) UL (ref 0.1–1)
MONOCYTES NFR BLD AUTO: 9.5 %
NEUTROPHILS # BLD AUTO: 5.6 X10 (3) UL (ref 1.5–7.7)
NEUTROPHILS # BLD AUTO: 5.6 X10(3) UL (ref 1.5–7.7)
NEUTROPHILS NFR BLD AUTO: 68.8 %
NONHDLC SERPL-MCNC: 108 MG/DL (ref ?–130)
OSMOLALITY SERPL CALC.SUM OF ELEC: 296 MOSM/KG (ref 275–295)
PLATELET # BLD AUTO: 203 10(3)UL (ref 150–450)
POTASSIUM SERPL-SCNC: 4.6 MMOL/L (ref 3.5–5.1)
PROT SERPL-MCNC: 6.8 G/DL (ref 5.7–8.2)
RBC # BLD AUTO: 5.41 X10(6)UL (ref 3.8–5.8)
SODIUM SERPL-SCNC: 141 MMOL/L (ref 136–145)
TRIGL SERPL-MCNC: 105 MG/DL (ref 30–149)
TSI SER-ACNC: 1.51 UIU/ML (ref 0.55–4.78)
VLDLC SERPL CALC-MCNC: 17 MG/DL (ref 0–30)
WBC # BLD AUTO: 8.1 X10(3) UL (ref 4–11)

## 2025-04-11 PROCEDURE — 85025 COMPLETE CBC W/AUTO DIFF WBC: CPT

## 2025-04-11 PROCEDURE — 80061 LIPID PANEL: CPT

## 2025-04-11 PROCEDURE — 36415 COLL VENOUS BLD VENIPUNCTURE: CPT

## 2025-04-11 PROCEDURE — 80053 COMPREHEN METABOLIC PANEL: CPT

## 2025-04-11 PROCEDURE — 84443 ASSAY THYROID STIM HORMONE: CPT

## 2025-04-22 ENCOUNTER — OFFICE VISIT (OUTPATIENT)
Dept: INTERNAL MEDICINE CLINIC | Facility: CLINIC | Age: 80
End: 2025-04-22
Payer: MEDICARE

## 2025-04-22 VITALS
DIASTOLIC BLOOD PRESSURE: 78 MMHG | HEART RATE: 80 BPM | SYSTOLIC BLOOD PRESSURE: 116 MMHG | HEIGHT: 67.52 IN | OXYGEN SATURATION: 98 % | RESPIRATION RATE: 17 BRPM | BODY MASS INDEX: 34.5 KG/M2 | WEIGHT: 225 LBS | TEMPERATURE: 97 F

## 2025-04-22 DIAGNOSIS — N28.1 RENAL CYST: ICD-10-CM

## 2025-04-22 DIAGNOSIS — J30.1 SEASONAL ALLERGIC RHINITIS DUE TO POLLEN: ICD-10-CM

## 2025-04-22 DIAGNOSIS — Z00.00 ROUTINE GENERAL MEDICAL EXAMINATION AT A HEALTH CARE FACILITY: Primary | ICD-10-CM

## 2025-04-22 DIAGNOSIS — G47.33 OSA (OBSTRUCTIVE SLEEP APNEA): ICD-10-CM

## 2025-04-22 DIAGNOSIS — N28.9 RENAL PARENCHYMAL DISEASE: ICD-10-CM

## 2025-04-22 DIAGNOSIS — I49.8 BIGEMINY: ICD-10-CM

## 2025-04-22 DIAGNOSIS — I25.10 CORONARY ARTERY CALCIFICATION SEEN ON CT SCAN: ICD-10-CM

## 2025-04-22 DIAGNOSIS — M17.0 BILATERAL PRIMARY OSTEOARTHRITIS OF KNEE: ICD-10-CM

## 2025-04-22 DIAGNOSIS — N18.31 STAGE 3A CHRONIC KIDNEY DISEASE (HCC): ICD-10-CM

## 2025-04-22 DIAGNOSIS — N40.1 BENIGN PROSTATIC HYPERPLASIA WITH URINARY FREQUENCY: ICD-10-CM

## 2025-04-22 DIAGNOSIS — E78.00 PURE HYPERCHOLESTEROLEMIA: ICD-10-CM

## 2025-04-22 DIAGNOSIS — J98.4 CALCIFIED GRANULOMA OF LUNG: ICD-10-CM

## 2025-04-22 DIAGNOSIS — R35.0 BENIGN PROSTATIC HYPERPLASIA WITH URINARY FREQUENCY: ICD-10-CM

## 2025-04-22 DIAGNOSIS — I77.819 ECTASIS AORTA: ICD-10-CM

## 2025-04-22 DIAGNOSIS — H90.0 CONDUCTIVE HEARING LOSS, BILATERAL: ICD-10-CM

## 2025-04-22 DIAGNOSIS — I10 ESSENTIAL HYPERTENSION: ICD-10-CM

## 2025-04-22 PROCEDURE — 3078F DIAST BP <80 MM HG: CPT | Performed by: NURSE PRACTITIONER

## 2025-04-22 PROCEDURE — 3074F SYST BP LT 130 MM HG: CPT | Performed by: NURSE PRACTITIONER

## 2025-04-22 PROCEDURE — 1159F MED LIST DOCD IN RCRD: CPT | Performed by: NURSE PRACTITIONER

## 2025-04-22 PROCEDURE — G0439 PPPS, SUBSEQ VISIT: HCPCS | Performed by: NURSE PRACTITIONER

## 2025-04-22 PROCEDURE — 1170F FXNL STATUS ASSESSED: CPT | Performed by: NURSE PRACTITIONER

## 2025-04-22 PROCEDURE — 99499 UNLISTED E&M SERVICE: CPT | Performed by: NURSE PRACTITIONER

## 2025-04-22 PROCEDURE — 3008F BODY MASS INDEX DOCD: CPT | Performed by: NURSE PRACTITIONER

## 2025-04-22 PROCEDURE — 96160 PT-FOCUSED HLTH RISK ASSMT: CPT | Performed by: NURSE PRACTITIONER

## 2025-04-22 PROCEDURE — G2211 COMPLEX E/M VISIT ADD ON: HCPCS | Performed by: NURSE PRACTITIONER

## 2025-04-22 PROCEDURE — 99214 OFFICE O/P EST MOD 30 MIN: CPT | Performed by: NURSE PRACTITIONER

## 2025-04-22 PROCEDURE — 1125F AMNT PAIN NOTED PAIN PRSNT: CPT | Performed by: NURSE PRACTITIONER

## 2025-04-22 RX ORDER — AMLODIPINE BESYLATE 5 MG/1
5 TABLET ORAL DAILY
Qty: 90 TABLET | Refills: 3 | Status: SHIPPED | OUTPATIENT
Start: 2025-04-22

## 2025-04-22 RX ORDER — ATORVASTATIN CALCIUM 20 MG/1
20 TABLET, FILM COATED ORAL NIGHTLY
Qty: 90 TABLET | Refills: 3 | Status: SHIPPED | OUTPATIENT
Start: 2025-04-22

## 2025-04-22 RX ORDER — LOSARTAN POTASSIUM 50 MG/1
50 TABLET ORAL DAILY
Qty: 90 TABLET | Refills: 3 | Status: SHIPPED | OUTPATIENT
Start: 2025-04-22

## 2025-04-22 NOTE — PROGRESS NOTES
Subjective:   Hermes Alvarez is a 79 year old male who presents for a MA AHA (Medicare Advantage Annual Health Assessment), Subsequent Annual Wellness visit (Pt already had Initial Annual Wellness), and Subsequent Annual Wellness visit (Pt already had Initial Annual Wellness) and scheduled follow up of multiple significant but stable problems.   History of Present Illness  Hermes Alvarez is a 79 year old male who presents for his annual wellness visit.    He experiences chronic pain in his knees and shoulders, which varies in intensity and is influenced by weather and activity. Cortisone injections have not provided lasting relief, and visits to a joint pain relief center have only offered temporary improvement. He avoids certain foods, such as bagels, which can exacerbate his symptoms.    He has a 'goofy trigger finger' affecting different fingers at different times, currently impacting his left little finger. This causes a snapping sensation, more pronounced at night and lessens by morning. The condition is not painful but is annoying, and it has resolved on its own in other fingers previously.    He is currently taking atorvastatin 20 mg for high cholesterol, with an LDL of 89. He is also on amlodipine and losartan for blood pressure management, which is well-controlled. He prefers to have his medications filled through [facility], despite having some prescriptions at [facility].    No use of a CPAP machine for sleep apnea and reports sleeping well until waking to use the bathroom.    History/Other:   Fall Risk Assessment:   He has been screened for Falls and is low risk.      Cognitive Assessment:   Abnormal  What day of the week is this?: Correct  What month is it?: Correct  What year is it?: Correct  Recall \"Ball\": Correct  Recall \"Flag\": Incorrect  Recall \"Tree\": Correct  MMSE SCORE: 29      Functional Ability/Status:   Hermes Alvarez has a completely normal functional assessment. See  flowsheet for details.      Depression Screening (PHQ):  PHQ-2 SCORE: 0  , done 4/22/2025   If you checked off any problems, how difficult have these problems made it for you to do your work, take care of things at home, or get along with other people?: Not difficult at all    Last [place] Suicide Screening on 4/22/2025 was No Risk.         Advanced Directives:   He does NOT have a Living Will. [Do you have a living will?: (Patient-Rptd) No]  He does NOT have a Power of  for Health Care. [Do you have a healthcare power of ?: (Patient-Rptd) No]  Not discussed      Problem List[1]  Allergies:  He has no known allergies.    Current Medications:  Active Meds, Sig Only[2]    Medical History:  He  has a past medical history of Duodenal ulcer, unspecified as acute or chronic, without hemorrhage, perforation, or obstruction, ED (erectile dysfunction), Esophageal hemorrhage, Microhematuria, Other and unspecified hyperlipidemia, and Unspecified essential hypertension.  Surgical History:  He  has a past surgical history that includes colonoscopy (2002 2009); upper gi endoscopy,exam (2009); and tonsillectomy (1957).   Family History:  His family history includes Cancer in his mother; Hypertension in his father and mother.  Social History:  He  reports that he has never smoked. He has never been exposed to tobacco smoke. He has never used smokeless tobacco. He reports that he does not currently use alcohol after a past usage of about 1.0 standard drink of alcohol per week. He reports that he does not use drugs.    Tobacco:  He has never smoked tobacco.    CAGE Alcohol Screen:   CAGE screening score of 0 on 4/17/2025, showing low risk of alcohol abuse.      Patient Care Team:  Thony Oh MD as PCP - General  Patricia Hall APRN (Internal Medicine)  Ayden Morrissey MD (UROLOGY)  Malena Maciel MD (NEPHROLOGY)    Review of Systems     Negative except as above      Objective:   Physical Exam  Physical  Exam  General Appearance:  Alert, cooperative, no distress, appears stated age   Head:  Normocephalic, without obvious abnormality, atraumatic   Eyes:  PERRL, conjunctiva/corneas clear, EOM's intact both eyes   Ears:  Normal TM's and external ear canals, both ears bilatearl hearing aids.    Nose: Nares normal, septum midline,mucosa normal, no drainage or sinus tenderness   Throat: Lips, mucosa, and tongue normal; teeth and gums normal   Neck: Supple, symmetrical, trachea midline, no adenopathy;  thyroid: not enlarged, symmetric, no JVD   Back:   Symmetric, no curvature, ROM normal, no CVA tenderness   Lungs:   Clear to auscultation bilaterally, respirations unlabored   Heart:  Regular rate and rhythm, S1 and S2 normal,    Abdomen:   Soft, non-tender, bowel sounds active all four quadrants,  no masses,    Extremities: Extremities normal, atraumatic, no edema  mild trigger finger noted.     Pulses: symmetric   Skin: Skin color, texture, turgor normal,    Lymph nodes: Cervical, supraclavicular nodes normal   Neurologic: Normal           /78 (BP Location: Left arm, Patient Position: Sitting, Cuff Size: large)   Pulse 80   Temp 97 °F (36.1 °C) (Temporal)   Resp 17   Ht 5' 7.52\" (1.715 m)   Wt 225 lb (102.1 kg)   SpO2 98%   BMI 34.70 kg/m²  Estimated body mass index is 34.7 kg/m² as calculated from the following:    Height as of this encounter: 5' 7.52\" (1.715 m).    Weight as of this encounter: 225 lb (102.1 kg).    Medicare Hearing Assessment:   Hearing Screening    Time taken: 4/22/2025 10:39 AM  Entry User: Patito Harrell CMA  Screening Method: Finger Rub  Finger Rub Result: Pass         Visual Acuity:   Right Eye Visual Acuity: Corrected Right Eye Chart Acuity: 20/40   Left Eye Visual Acuity: Corrected Left Eye Chart Acuity: 20/20   Both Eyes Visual Acuity: Corrected Both Eyes Chart Acuity: 20/30   Able To Tolerate Visual Acuity: Yes        Assessment & Plan:   Hermes Alvarez is a 79 year old male  who presents for a Medicare Assessment.     1. Routine general medical examination at a health care facility (Primary)  2. Stage 3a chronic kidney disease (HCC)stable  limits opitosn for arthritis treatment.  Monitor.   3. Ectasis aorta  stable  Dr Morataya cardiology  4. Bigeminy  stable  Dr Morataya cardiology  5. Coronary artery calcification seen on CT scan  stable  Dr Morataya cardiology  6. Essential hypertension  stable same meds.   7. Pure hypercholesterolemia    stable  statin.   8. Calcified granuloma of lung stable  monitor.   9. Bilateral primary osteoarthritis of knee  stable  limited options. Monitor,.   10. Benign prostatic hyperplasia with urinary frequency  stable  monitor.   11. Renal cyst  stable  monitor.   12. Renal parenchymal disease  stable  montior.   13. Seasonal allergic rhinitis due to pollen  stable  monitor.   14. Conductive hearing loss, bilateral  stable  monitor.   15. STEPHANY (obstructive sleep apnea)  Other orders  -     amLODIPine Besylate; Take 1 tablet (5 mg total) by mouth daily.  Dispense: 90 tablet; Refill: 3  -     Atorvastatin Calcium; Take 1 tablet (20 mg total) by mouth nightly.  Dispense: 90 tablet; Refill: 3  -     Losartan Potassium; Take 1 tablet (50 mg total) by mouth daily.  Dispense: 90 tablet; Refill: 3    Assessment & Plan  Wellness Visit  Annual wellness visit conducted. Immunizations up to date. Blood pressure controlled. Improved kidney function. PSA not checked due to age-related guidelines.  - Continue atorvastatin, amlodipine, losartan, and aspirin.  - Refill atorvastatin, amlodipine, and losartan with 90-day supply and [facility] to [facility].  - Monitor kidney function regularly.  - No PSA testing unless clinically indicated.    Essential hypertension  Blood pressure well-controlled with current regimen.  - Continue amlodipine and losartan.    Hyperlipidemia  LDL cholesterol controlled at 89 mg/dL with atorvastatin 20 mg.  - Continue atorvastatin 20 mg  nightly.    Bilateral primary osteoarthritis of knee  Chronic knee pain with dietary triggers identified. Cortisone injections ineffective. Limited pain management options due to renal disease.  - Avoid high carbohydrate foods and sugars.  - Consider orthopedic referral if condition worsens.    Trigger finger  Intermittent symptoms in left little finger. No severe pain. Limited treatment options discussed.  - Consider referral to orthopedic hand specialist if symptoms worsen.    Obstructive sleep apnea (STEPHANY)  No CPAP use. Reports adequate sleep until waking to urinate.  The patient indicates understanding of these issues and agrees to the plan.  Reinforced healthy diet, lifestyle, and exercise.      No follow-ups on file.     KENJI Leal, 4/22/2025     Supplementary Documentation:   General Health:  In the past six months, have you lost more than 10 pounds without trying?: (Patient-Rptd) 2 - No  Has your appetite been poor?: (Patient-Rptd) No  Type of Diet: (Patient-Rptd) Balanced  How does the patient maintain a good energy level?: (Patient-Rptd) Appropriate Exercise, Stretching  How would you describe your daily physical activity?: (Patient-Rptd) Moderate  How would you describe your current health state?: (Patient-Rptd) Good  How do you maintain positive mental well-being?: (Patient-Rptd) Social Interaction, Puzzles, Visiting Friends, Visiting Family  On a scale of 0 to 10, with 0 being no pain and 10 being severe pain, what is your pain level?: (Patient-Rptd) 6 - (Moderate)  In the past six months, have you experienced urine leakage?: (Patient-Rptd) 0-No  At any time do you feel concerned for the safety/well-being of yourself and/or your children, in your home or elsewhere?: No  Have you had any immunizations at another office such as Influenza, Hepatitis B, Tetanus, or Pneumococcal?: (Patient-Rptd) Yes    Health Maintenance   Topic Date Due    COVID-19 Vaccine (6 - 2024-25 season) 09/01/2024    Annual  Well Visit  01/01/2025    Annual Depression Screening  01/01/2025    Influenza Vaccine  Completed    Fall Risk Screening (Annual)  Completed    Pneumococcal Vaccine: 50+ Years  Completed    Zoster Vaccines  Completed    Meningococcal B Vaccine  Aged Out    Colorectal Cancer Screening  Discontinued        The following individual(s) verbally consented to be recorded using ambient AI listening technology and understand that they can each withdraw their consent to this listening technology at any point by asking the clinician to turn off or pause the recording:    Patient name: Hermes Alvarez  Additional names:             [1]   Patient Active Problem List  Diagnosis    Pure hypercholesterolemia    Essential hypertension    Impotence    Ectasis aorta    CKD (chronic kidney disease) stage 3, GFR 30-59 ml/min (HCC)    Renal cyst    Renal parenchymal disease    Bilateral primary osteoarthritis of knee    Allergic rhinitis    Benign prostatic hyperplasia with lower urinary tract symptoms    Severe obesity (BMI 35.0-39.9) with comorbidity (HCC)    Conductive hearing loss, bilateral    STEPHANY (obstructive sleep apnea)    Calcified granuloma of lung    Coronary artery calcification seen on CT scan    Bigeminy   [2]   Outpatient Medications Marked as Taking for the 4/22/25 encounter (Office Visit) with Patricia Hall APRN   Medication Sig    amLODIPine 5 MG Oral Tab Take 1 tablet (5 mg total) by mouth daily.    atorvastatin 20 MG Oral Tab Take 1 tablet (20 mg total) by mouth nightly.    losartan 50 MG Oral Tab Take 1 tablet (50 mg total) by mouth daily.    aspirin 81 MG Oral Tab EC Take 1 tablet (81 mg total) by mouth daily.    Multiple Vitamins-Minerals (DAILY COMBO MULTI VITAMINS) Oral Tab Take by mouth. Vit D, Vit C, Zinc

## 2025-05-12 RX ORDER — ATORVASTATIN CALCIUM 20 MG/1
20 TABLET, FILM COATED ORAL NIGHTLY
Qty: 90 TABLET | Refills: 2 | OUTPATIENT
Start: 2025-05-12

## 2025-05-12 NOTE — TELEPHONE ENCOUNTER
Year supply of refills was sent on 04/22/2025 to [facility] .90 day supply was dispensed on  04/23/2025.    Deep is requesting.    MAR Comment Waste Waste Unit          Outpatient Medication Detail     Disp Refills Start End    atorvastatin 20 MG Oral Tab 90 tablet 3 4/22/2025 --    Sig - Route: Take 1 tablet (20 mg total) by mouth nightly. - Oral    Sent to pharmacy as: Atorvastatin Calcium 20 MG Oral Tablet (Lipitor)    E-Prescribing Status: Receipt confirmed by pharmacy (4/22/2025 10:24 AM CDT)      Pharmacy    [facility] - [facility] [id number] [phone], [phone]

## 2025-05-12 NOTE — TELEPHONE ENCOUNTER
Per patient's Mychart response - sending remaining refills to [facility].

## (undated) NOTE — LETTER
05/03/19        Roseanne Plants  [facility] [address] [id number]      Dear Hieu Ocampo,    Our records indicate that you have outstanding lab work and or testing that was ordered for you and has not yet been completed:  Orders Placed This Encounter

## (undated) NOTE — LETTER
09/16/19        [address] [phone]      Dear Norberto Perez,    Our records indicate that you have outstanding lab work and or testing that was ordered for you and has not yet been completed:  Orders Placed This Encounter

## (undated) NOTE — MR AVS SNAPSHOT
[facility] [address] [id number] [phone]               Thank you for choosing us for your health care visit with DAVID Zamora.   We are glad to serve you and happy to provide you with this summar At [facility]  Annually for Diabetics No results found for: A1C No flowsheet data found.     Fasting Blood Sugar (FSB)   Patient must be diagnosed with one of the following:   • Hypertension   • Dyslipidemia   • Obesity (BMI ³30 kg/m2)   • Previous elevated impa Fecal Occult Blood   Covered Annually No results found for: FOB, OCCULTSTOOL No flowsheet data found.      Barium Enema-   uncomfortable but covered  Covered but uncomfortable   Glaucoma Screening      Ophthalmology Visit   Covered annually for Diabetics, Recommended Websites for Advanced Directives    [email or web address]  An information packet, including necessary form from the [employer] website. http://[email or web address].[email or web address]/public/books/advin You can access your MyChart to more actively manage your health care and view more details from this visit by going to [email or web address].   If you've recently had a stay at the Hospital you can access your discharge instructions in [address] by ariel You don’t need to join a gym. Home exercises work great.  Put more priority on exercise in your life                    Visit [facility] online at  [email or web address]